# Patient Record
Sex: FEMALE | Race: WHITE | NOT HISPANIC OR LATINO
[De-identification: names, ages, dates, MRNs, and addresses within clinical notes are randomized per-mention and may not be internally consistent; named-entity substitution may affect disease eponyms.]

---

## 2019-04-01 ENCOUNTER — APPOINTMENT (OUTPATIENT)
Dept: OBGYN | Facility: CLINIC | Age: 49
End: 2019-04-01
Payer: MEDICARE

## 2019-04-01 VITALS
WEIGHT: 146 LBS | DIASTOLIC BLOOD PRESSURE: 76 MMHG | HEIGHT: 68 IN | SYSTOLIC BLOOD PRESSURE: 110 MMHG | BODY MASS INDEX: 22.13 KG/M2

## 2019-04-01 PROCEDURE — G0101: CPT

## 2019-04-01 NOTE — HISTORY OF PRESENT ILLNESS
[Last Mammogram ___] : Last Mammogram was [unfilled] [Last Pap ___] : Last cervical pap smear was [unfilled] [Definite:  ___ (Date)] : the last menstrual period was [unfilled] [Regular Cycle Intervals] : periods have been regular [Menarche Age: ____] : age at menarche was [unfilled] [Sexually Active] : is sexually active [Monogamous] : is monogamous [Male ___] : [unfilled] male

## 2019-04-01 NOTE — CHIEF COMPLAINT
[Annual Visit] : annual visit [FreeTextEntry1] : 48 yo  with LMP 3/16/19 presents from prior practice for annual exam.

## 2019-04-03 LAB — HPV HIGH+LOW RISK DNA PNL CVX: NOT DETECTED

## 2019-11-12 ENCOUNTER — FORM ENCOUNTER (OUTPATIENT)
Age: 49
End: 2019-11-12

## 2019-11-13 ENCOUNTER — APPOINTMENT (OUTPATIENT)
Dept: MAMMOGRAPHY | Facility: CLINIC | Age: 49
End: 2019-11-13
Payer: MEDICARE

## 2019-11-13 ENCOUNTER — OUTPATIENT (OUTPATIENT)
Dept: OUTPATIENT SERVICES | Facility: HOSPITAL | Age: 49
LOS: 1 days | End: 2019-11-13
Payer: MEDICARE

## 2019-11-13 DIAGNOSIS — Z00.00 ENCOUNTER FOR GENERAL ADULT MEDICAL EXAMINATION WITHOUT ABNORMAL FINDINGS: ICD-10-CM

## 2019-11-13 PROCEDURE — 77063 BREAST TOMOSYNTHESIS BI: CPT

## 2019-11-13 PROCEDURE — 77067 SCR MAMMO BI INCL CAD: CPT | Mod: 26

## 2019-11-13 PROCEDURE — 77063 BREAST TOMOSYNTHESIS BI: CPT | Mod: 26

## 2019-11-13 PROCEDURE — 77067 SCR MAMMO BI INCL CAD: CPT

## 2019-11-21 ENCOUNTER — APPOINTMENT (OUTPATIENT)
Dept: FAMILY MEDICINE | Facility: CLINIC | Age: 49
End: 2019-11-21
Payer: MEDICARE

## 2019-11-21 VITALS
WEIGHT: 140 LBS | HEIGHT: 59.75 IN | BODY MASS INDEX: 27.48 KG/M2 | OXYGEN SATURATION: 98 % | RESPIRATION RATE: 16 BRPM | DIASTOLIC BLOOD PRESSURE: 68 MMHG | SYSTOLIC BLOOD PRESSURE: 122 MMHG | HEART RATE: 74 BPM

## 2019-11-21 PROCEDURE — G0438: CPT

## 2019-11-21 PROCEDURE — 99213 OFFICE O/P EST LOW 20 MIN: CPT | Mod: 25

## 2019-11-21 NOTE — PLAN
[FreeTextEntry1] : Asthma- well controlled, cont albuterol PRN\par Sinusitis- cont abx and f/u with ENT\par Hair loss- f/u labs however likely secondary to stress\par Anxiety/depression- f/u with behavioral health\par HCM- f/u labs. Refusing flu shot at this time. Has plans to get colonoscopy

## 2019-11-21 NOTE — HEALTH RISK ASSESSMENT
[(PHQ-2) Unable to screen] : PHQ-2: unable to screen [Yes] : Yes [Patient reported mammogram was normal] : Patient reported mammogram was normal [Patient reported PAP Smear was normal] : Patient reported PAP Smear was normal [None] : None [With Family] : lives with family [On disability] : on disability [College] : College [] :  [Feels Safe at Home] : Feels safe at home [Fully functional (bathing, dressing, toileting, transferring, walking, feeding)] : Fully functional (bathing, dressing, toileting, transferring, walking, feeding) [Fully functional (using the telephone, shopping, preparing meals, housekeeping, doing laundry, using] : Fully functional and needs no help or supervision to perform IADLs (using the telephone, shopping, preparing meals, housekeeping, doing laundry, using transportation, managing medications and managing finances) [With Patient/Caregiver] : With Patient/Caregiver [Aggressive treatment] : aggressive treatment [] : No [Smoke Detector] : no smoke detector [Seat Belt] : does not use seat belt [MammogramDate] : 11/19 [PapSmearDate] : 04/19 [AdvancecareDate] : 1/21/19

## 2019-11-21 NOTE — PHYSICAL EXAM
[No Acute Distress] : no acute distress [Well-Appearing] : well-appearing [Normal Sclera/Conjunctiva] : normal sclera/conjunctiva [PERRL] : pupils equal round and reactive to light [Normal Oropharynx] : the oropharynx was normal [No Lymphadenopathy] : no lymphadenopathy [Supple] : supple [No Respiratory Distress] : no respiratory distress  [No Accessory Muscle Use] : no accessory muscle use [Normal Rate] : normal rate  [Regular Rhythm] : with a regular rhythm [Soft] : abdomen soft [Non Tender] : non-tender [Non-distended] : non-distended [Normal Bowel Sounds] : normal bowel sounds [No Rash] : no rash [No Focal Deficits] : no focal deficits [Normal Affect] : the affect was normal [Normal Insight/Judgement] : insight and judgment were intact [de-identified] : + fluid behind L TM
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
## 2019-11-21 NOTE — REVIEW OF SYSTEMS
[Fever] : no fever [Chills] : no chills [Fatigue] : no fatigue [Discharge] : no discharge [Vision Problems] : no vision problems [Earache] : no earache [Nasal Discharge] : no nasal discharge [Sore Throat] : no sore throat [Chest Pain] : no chest pain [Palpitations] : no palpitations [Shortness Of Breath] : no shortness of breath [Wheezing] : no wheezing [Cough] : no cough

## 2020-04-27 ENCOUNTER — LABORATORY RESULT (OUTPATIENT)
Age: 50
End: 2020-04-27

## 2020-04-27 ENCOUNTER — APPOINTMENT (OUTPATIENT)
Dept: FAMILY MEDICINE | Facility: CLINIC | Age: 50
End: 2020-04-27
Payer: MEDICARE

## 2020-04-27 VITALS
DIASTOLIC BLOOD PRESSURE: 80 MMHG | TEMPERATURE: 98.9 F | HEIGHT: 59.75 IN | BODY MASS INDEX: 27.48 KG/M2 | OXYGEN SATURATION: 97 % | SYSTOLIC BLOOD PRESSURE: 136 MMHG | HEART RATE: 79 BPM | RESPIRATION RATE: 16 BRPM | WEIGHT: 140 LBS

## 2020-04-27 DIAGNOSIS — A69.20 LYME DISEASE, UNSPECIFIED: ICD-10-CM
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
## 2020-04-27 DIAGNOSIS — L30.9 DERMATITIS, UNSPECIFIED: ICD-10-CM

## 2020-04-27 PROCEDURE — 99214 OFFICE O/P EST MOD 30 MIN: CPT

## 2020-04-28 LAB — B BURGDOR IGG+IGM SER QL IB: NORMAL

## 2020-05-04 LAB
ANAPLASMA PHAGOCYTO IGM COMENT: NORMAL
ANAPLASMA PHAGOCYTO IGM COMMENT: NORMAL
ANAPLASMA PHAGOCYTOPHILIA IGG ANTIBODIES: NORMAL
ANAPLASMA PHAGOCYTOPHILIA IGM ANTIBODIES: NORMAL
B MICROTI AB SER QL: NORMAL
BABESIA ANTIBODIES, IGG: NORMAL
BABESIA ANTIBODIES, IGM: NORMAL
EHRLICIA CHAFFEENIS IGG ANTIBODIES: NORMAL
EHRLICIA CHAFFEENIS IGG COMMENT: NORMAL
EHRLICIA CHAFFEENIS IGG INTERP: NORMAL
EHRLICIA CHAFFEENIS IGM ANTIBODIES: NORMAL

## 2020-06-15 ENCOUNTER — LABORATORY RESULT (OUTPATIENT)
Age: 50
End: 2020-06-15

## 2020-06-16 LAB — B BURGDOR IGG+IGM SER QL IB: NORMAL

## 2021-01-25 ENCOUNTER — APPOINTMENT (OUTPATIENT)
Dept: FAMILY MEDICINE | Facility: CLINIC | Age: 51
End: 2021-01-25
Payer: MEDICARE

## 2021-01-25 VITALS
DIASTOLIC BLOOD PRESSURE: 80 MMHG | OXYGEN SATURATION: 98 % | WEIGHT: 148 LBS | HEIGHT: 59.75 IN | BODY MASS INDEX: 29.06 KG/M2 | TEMPERATURE: 97.6 F | HEART RATE: 97 BPM | SYSTOLIC BLOOD PRESSURE: 128 MMHG

## 2021-01-25 PROCEDURE — 99072 ADDL SUPL MATRL&STAF TM PHE: CPT

## 2021-01-25 PROCEDURE — 99213 OFFICE O/P EST LOW 20 MIN: CPT

## 2021-01-25 RX ORDER — MELOXICAM 7.5 MG/1
7.5 TABLET ORAL TWICE DAILY
Qty: 60 | Refills: 0 | Status: ACTIVE | COMMUNITY
Start: 2021-01-25 | End: 1900-01-01

## 2021-01-25 RX ORDER — PREDNISONE 10 MG/1
10 TABLET ORAL DAILY
Qty: 30 | Refills: 0 | Status: COMPLETED | COMMUNITY
Start: 2020-04-27 | End: 2021-01-25

## 2021-01-25 RX ORDER — DOXYCYCLINE HYCLATE 100 MG/1
100 CAPSULE ORAL TWICE DAILY
Qty: 14 | Refills: 0 | Status: COMPLETED | COMMUNITY
Start: 2020-04-27 | End: 2021-01-25

## 2021-01-25 NOTE — HISTORY OF PRESENT ILLNESS
[FreeTextEntry8] : 50 year old female presents with low back pain. States last March she fell off her hammock. Went to urgent care and was given flexeril which helped. Then this past December twisted her back out. She has been unable to walk up the stairs due to pain. Admits to low back pain that radiates down right leg to her foot. She admits to hx of back issues and is on disability for multiple herniated discs. She does not see anyone for this and has been dealing with the pain.

## 2021-01-25 NOTE — PHYSICAL EXAM
[No Acute Distress] : no acute distress [Well-Appearing] : well-appearing [Normal Sclera/Conjunctiva] : normal sclera/conjunctiva [PERRL] : pupils equal round and reactive to light [Normal Outer Ear/Nose] : the outer ears and nose were normal in appearance [No Respiratory Distress] : no respiratory distress  [No Accessory Muscle Use] : no accessory muscle use [Clear to Auscultation] : lungs were clear to auscultation bilaterally [Normal Rate] : normal rate  [Soft] : abdomen soft [Non Tender] : non-tender [Non-distended] : non-distended [Normal Bowel Sounds] : normal bowel sounds [No Rash] : no rash [Normal Affect] : the affect was normal [Normal Insight/Judgement] : insight and judgment were intact

## 2021-01-25 NOTE — PLAN
[FreeTextEntry1] : Low back pain with sciatica\par - trial of meloxicam, flexeril and medrol dose ava\par - f/u with ortho spine due to chronic back pain and herniated discs\par - physical therapy referral\par \par f/u for CPE

## 2021-02-01 ENCOUNTER — APPOINTMENT (OUTPATIENT)
Dept: OBGYN | Facility: CLINIC | Age: 51
End: 2021-02-01

## 2021-02-05 ENCOUNTER — APPOINTMENT (OUTPATIENT)
Dept: MRI IMAGING | Facility: CLINIC | Age: 51
End: 2021-02-05
Payer: MEDICARE

## 2021-02-05 PROCEDURE — 72148 MRI LUMBAR SPINE W/O DYE: CPT

## 2021-03-04 ENCOUNTER — APPOINTMENT (OUTPATIENT)
Dept: OBGYN | Facility: CLINIC | Age: 51
End: 2021-03-04

## 2021-03-08 ENCOUNTER — APPOINTMENT (OUTPATIENT)
Dept: NEUROSURGERY | Facility: CLINIC | Age: 51
End: 2021-03-08
Payer: MEDICARE

## 2021-03-08 VITALS
WEIGHT: 146 LBS | HEART RATE: 90 BPM | HEIGHT: 59.75 IN | TEMPERATURE: 98.5 F | SYSTOLIC BLOOD PRESSURE: 135 MMHG | BODY MASS INDEX: 28.66 KG/M2 | DIASTOLIC BLOOD PRESSURE: 84 MMHG

## 2021-03-08 PROCEDURE — 99204 OFFICE O/P NEW MOD 45 MIN: CPT

## 2021-03-08 PROCEDURE — 99072 ADDL SUPL MATRL&STAF TM PHE: CPT

## 2021-03-08 NOTE — PLAN
[FreeTextEntry1] : \par DIAGNOSIS:    LUMBAR  STENOSIS/DISK DEGENERATION/SPONDYLOLISTHESIS  WITH RADICULOPATHY L45 right\par TREATMENT PLAN:  NON-SURGICAL  VS. LUMBAR DECOMPRESSION WITH INSTRUMENTED STABILIZATION AND FUSION   L4-5 \par \par This is a patient with degenerated lumbar disks with associated stenosis and spondylosis.  I have recommended nonsurgical management at this time.  This consists of physical therapy and/or manual medicine in conjunction to medical therapy and other conservative methods.  These include the consideration of trigger point injections and or the utilization of modalities such as TENS where applicable.  The next tier would be the referral to a pain management specialist (anesthesia or physiatry) for the consideration of spinal injections.  This includes the options of epidural steroids, facet injections as well as other novel techniques that may provide pain relief.  \par \par If all nonsurgical methods fail or there is neurological issue of concern, I have recommended lumbar decompression as a treatment option.  This entails removing the lamina and the medial facet joints along with the underlying hypertrophied ligamentum flavum.  This will allow for a widening of the spinal canal and the neuroforamen at the effected levels.  In doing so will likely result in added instability to the spine at this level requiring the need for instrumented stabilization and fusion.  This implies the use of pedicle screws and possibly interbody prosthetics to provide strength and anatomical alignment to the operated segments.  \par \par Risks and benefits of surgery were described to the patient in detail which include but not excluding those otherwise not mentioned:  coma paralysis, death, stroke, spinal fluid leak, nerve injury, weakness, infection, hardware malfunction/failure/mal-positioning requiring re-operation, vascular injury, nonunion/pseudoarthrosis, adjacent segment degeneration, dysphonia/dysphagia and persistent pain.\par \par I have reviewed the images in detail with the patient today in my office and have answered all questions regarding this condition to the best of my ability to the patient’s satisfaction.

## 2021-03-08 NOTE — PHYSICAL EXAM
[Antalgic] : antalgic [Able to toe walk] : the patient was able to toe walk [Able to heel walk] : the patient was able to heel walk [Intact] : all sensory within normal limits bilaterally

## 2021-03-08 NOTE — HISTORY OF PRESENT ILLNESS
[de-identified] : \par Layne is a pleasant 50-year-old here for evaluation of her lumbar spine. She has a history of back problems and has been disabled as a result of this in the past however had some acute issues it occurred over the past year or so. She says she fell off a [employer] March of 2020 and then slipped on some ice in December really exacerbating her back problems she is excruciating back and leg pain on the right and difficulty walking distances. She has what sounds like a combination of axial back pain and some degree of radiculopathy with maybe a neurogenic claudication. She's or the orthopedic colleague of [facility] locally who got x-rays and an MRI study for my review and she seen in the office now with the studies intact. She has no symptoms of cauda equina compression. She has not done any significant nonsurgical management as of yet. Some self-directed physical therapy was only causing her more pain. \par \par hammock   March 2020\par slip on ice December\par \par \par \par ortho Zoe\par PCP Gela Diaz\par

## 2021-03-08 NOTE — REASON FOR VISIT
[New Patient Visit] : a new patient visit [Referred By: _________] : Patient was referred by ALYSIA [FreeTextEntry1] : Lower back pain

## 2021-03-08 NOTE — RESULTS/DATA
[FreeTextEntry1] : \par [facility] MRI reveals a grade 1 spondylolisthesis at L4-5 which is seen on x-rays. She's got facet fluid at the 45 joint and stenosis at this level with disc degeneration.

## 2021-04-02 ENCOUNTER — APPOINTMENT (OUTPATIENT)
Dept: DISASTER EMERGENCY | Facility: CLINIC | Age: 51
End: 2021-04-02

## 2021-04-03 LAB — SARS-COV-2 N GENE NPH QL NAA+PROBE: NOT DETECTED

## 2021-05-07 ENCOUNTER — APPOINTMENT (OUTPATIENT)
Dept: DISASTER EMERGENCY | Facility: CLINIC | Age: 51
End: 2021-05-07

## 2021-05-07 DIAGNOSIS — Z01.818 ENCOUNTER FOR OTHER PREPROCEDURAL EXAMINATION: ICD-10-CM

## 2021-05-08 LAB — SARS-COV-2 N GENE NPH QL NAA+PROBE: NOT DETECTED

## 2021-12-23 ENCOUNTER — APPOINTMENT (OUTPATIENT)
Dept: FAMILY MEDICINE | Facility: CLINIC | Age: 51
End: 2021-12-23
Payer: MEDICARE

## 2021-12-23 DIAGNOSIS — U07.1 COVID-19: ICD-10-CM

## 2021-12-23 PROCEDURE — 99214 OFFICE O/P EST MOD 30 MIN: CPT | Mod: 95

## 2021-12-26 NOTE — HISTORY OF PRESENT ILLNESS
[Home] : at home, [unfilled] , at the time of the visit. [Medical Office: ([facility])___] : at the medical office located in  [Verbal consent obtained from patient] : the patient, [unfilled] [FreeTextEntry8] : Pt dx with COVID 19 PCR+ on 12/20/21. Symptoms started on 12/11/21 w/ cough. Pt still reports chills, low grade fevers, persistent cough. Pt was also started on antibx levofloxacin 500mg q day.\par Pt also has chronic back pains, muscle relaxant has helped in the past.

## 2021-12-26 NOTE — ASSESSMENT
[FreeTextEntry1] : COVID 19 infx - Advised pt to self quarantine. Advised symptomatic care- albuterol prn dyspnea/wheeze, benzonatate prn cough,  tylenol prn myalgias or fevers. Monitor pulse ox. Advised pt on indications to seek ED care incl worsening dyspnea, SpO2< 94, dehydration, AMS\par low back pain - tizanidine prn spasm

## 2022-01-12 ENCOUNTER — APPOINTMENT (OUTPATIENT)
Dept: OBGYN | Facility: CLINIC | Age: 52
End: 2022-01-12
Payer: MEDICARE

## 2022-01-12 ENCOUNTER — APPOINTMENT (OUTPATIENT)
Dept: COLORECTAL SURGERY | Facility: CLINIC | Age: 52
End: 2022-01-12
Payer: MEDICARE

## 2022-01-12 VITALS
HEIGHT: 59.75 IN | BODY MASS INDEX: 27.88 KG/M2 | DIASTOLIC BLOOD PRESSURE: 80 MMHG | SYSTOLIC BLOOD PRESSURE: 120 MMHG | WEIGHT: 142 LBS

## 2022-01-12 VITALS — TEMPERATURE: 98.7 F

## 2022-01-12 DIAGNOSIS — Z87.01 PERSONAL HISTORY OF PNEUMONIA (RECURRENT): ICD-10-CM

## 2022-01-12 PROCEDURE — 99396 PREV VISIT EST AGE 40-64: CPT

## 2022-01-12 PROCEDURE — 99203 OFFICE O/P NEW LOW 30 MIN: CPT

## 2022-01-12 RX ORDER — METHYLPREDNISOLONE 4 MG/1
4 TABLET ORAL
Qty: 1 | Refills: 0 | Status: COMPLETED | COMMUNITY
Start: 2021-01-25 | End: 2022-01-12

## 2022-01-12 NOTE — HISTORY OF PRESENT ILLNESS
[FreeTextEntry1] : 51 year old female who presents for consultation for a screening colonoscopy. She reports alternating constipation and diarrhea mostly related to certain foods in diet. Otherwise, she denies any gastrointestinal symptoms such as abdominal pain, nausea, vomiting, melena or hematochezia.

## 2022-01-12 NOTE — CONSULT LETTER
[Dear  ___] : Dear  [unfilled], [Consult Letter:] : I had the pleasure of evaluating your patient, [unfilled]. [Please see my note below.] : Please see my note below. [Consult Closing:] : Thank you very much for allowing me to participate in the care of this patient.  If you have any questions, please do not hesitate to contact me. [Sincerely,] : Sincerely, [FreeTextEntry3] : Paul Peña MD\par

## 2022-01-12 NOTE — PHYSICAL EXAM
[Appropriately responsive] : appropriately responsive [Alert] : alert [No Acute Distress] : no acute distress [No Lymphadenopathy] : no lymphadenopathy [Soft] : soft [Non-tender] : non-tender [Non-distended] : non-distended [No HSM] : No HSM [No Lesions] : no lesions [No Mass] : no mass [Oriented x3] : oriented x3 [Examination Of The Breasts] : a normal appearance [No Masses] : no breast masses were palpable [Labia Majora] : normal [Labia Minora] : normal [Normal] : normal [Uterine Adnexae] : normal [FreeTextEntry1] : fissures of skin about perineum. Thickened white skin at edge. Not verrucous. [FreeTextEntry9] : tommy negative

## 2022-01-12 NOTE — HISTORY OF PRESENT ILLNESS
[FreeTextEntry1] : 52 yo  with LMP 12/15/21\par \par cc: vaginal irritation\par \par HPI:  12/15 took care of woman with pneumonia. Pt tested positive for COVID-19 on . she was hospitalized with pneumonia and dehydration.  Feeling better now. \par \par OB:\par C/S x 2 sons\par  and  with BTL\par \par Gyn:\par Menstrual triad: 12 x 28  x 5\par all normal Paps\par  MRI pelvis suggested adenomyosis\par \par Med:\par spondylodesis\par \par SH: ,  [Mammogramdate] : 2019 [PapSmeardate] : 2019 [BoneDensityDate] : ? [ColonoscopyDate] : none

## 2022-01-12 NOTE — PHYSICAL EXAM
[Respiratory Effort] : normal respiratory effort [Normal Rate and Rhythm] : normal rate and rhythm [Calm] : calm [de-identified] : soft, non tender, non distended. Pfannenstiel incision noted. No mass or hernias appreciated.   [de-identified] : well appearing, in no distress [de-identified] : normocephalic, atraumatic [de-identified] : moves extremities without difficulty [de-identified] : warm and dry [de-identified] : alert and oriented x 3

## 2022-01-13 ENCOUNTER — NON-APPOINTMENT (OUTPATIENT)
Age: 52
End: 2022-01-13

## 2022-01-16 LAB — HPV HIGH+LOW RISK DNA PNL CVX: NOT DETECTED

## 2022-01-21 ENCOUNTER — APPOINTMENT (OUTPATIENT)
Dept: FAMILY MEDICINE | Facility: CLINIC | Age: 52
End: 2022-01-21

## 2022-01-28 ENCOUNTER — APPOINTMENT (OUTPATIENT)
Dept: OBGYN | Facility: CLINIC | Age: 52
End: 2022-01-28
Payer: MEDICARE

## 2022-01-28 ENCOUNTER — LABORATORY RESULT (OUTPATIENT)
Age: 52
End: 2022-01-28

## 2022-01-28 VITALS
HEIGHT: 59.75 IN | RESPIRATION RATE: 16 BRPM | HEART RATE: 80 BPM | WEIGHT: 142 LBS | DIASTOLIC BLOOD PRESSURE: 74 MMHG | SYSTOLIC BLOOD PRESSURE: 122 MMHG | BODY MASS INDEX: 27.88 KG/M2

## 2022-01-28 DIAGNOSIS — N90.4 LEUKOPLAKIA OF VULVA: ICD-10-CM

## 2022-01-28 PROCEDURE — 56605 BIOPSY OF VULVA/PERINEUM: CPT

## 2022-01-28 NOTE — PROCEDURE
[Local Anesthesia] : local anesthesia [____ Lidocaine w/Epi] : ~VmL  lidocaine with epinephrine [Betadine] : Betadine [Sent to Pathology] : placed in buffered formalin and sent for pathology [Punch] : punch biopsy [Silver Nitrate] : silver nitrate [Tolerated Well] : the patient tolerated the procedure well [No Complications] : there were no complications [de-identified] : 4 mm

## 2022-02-03 ENCOUNTER — RESULT REVIEW (OUTPATIENT)
Age: 52
End: 2022-02-03

## 2022-02-08 ENCOUNTER — APPOINTMENT (OUTPATIENT)
Dept: FAMILY MEDICINE | Facility: CLINIC | Age: 52
End: 2022-02-08
Payer: MEDICARE

## 2022-02-08 ENCOUNTER — APPOINTMENT (OUTPATIENT)
Dept: MAMMOGRAPHY | Facility: CLINIC | Age: 52
End: 2022-02-08
Payer: MEDICARE

## 2022-02-08 ENCOUNTER — OUTPATIENT (OUTPATIENT)
Dept: OUTPATIENT SERVICES | Facility: HOSPITAL | Age: 52
LOS: 1 days | End: 2022-02-08
Payer: MEDICARE

## 2022-02-08 ENCOUNTER — RESULT REVIEW (OUTPATIENT)
Age: 52
End: 2022-02-08

## 2022-02-08 VITALS
TEMPERATURE: 97.3 F | SYSTOLIC BLOOD PRESSURE: 132 MMHG | BODY MASS INDEX: 27.48 KG/M2 | HEIGHT: 59.75 IN | OXYGEN SATURATION: 97 % | WEIGHT: 140 LBS | DIASTOLIC BLOOD PRESSURE: 72 MMHG | HEART RATE: 73 BPM

## 2022-02-08 DIAGNOSIS — Z00.00 ENCOUNTER FOR GENERAL ADULT MEDICAL EXAMINATION W/OUT ABNORMAL FINDINGS: ICD-10-CM

## 2022-02-08 DIAGNOSIS — Z13.220 ENCOUNTER FOR SCREENING FOR LIPOID DISORDERS: ICD-10-CM

## 2022-02-08 DIAGNOSIS — Z13.1 ENCOUNTER FOR SCREENING FOR DIABETES MELLITUS: ICD-10-CM

## 2022-02-08 DIAGNOSIS — Z12.39 ENCOUNTER FOR OTHER SCREENING FOR MALIGNANT NEOPLASM OF BREAST: ICD-10-CM

## 2022-02-08 PROCEDURE — 77063 BREAST TOMOSYNTHESIS BI: CPT | Mod: 26

## 2022-02-08 PROCEDURE — G0439: CPT

## 2022-02-08 PROCEDURE — 77063 BREAST TOMOSYNTHESIS BI: CPT

## 2022-02-08 PROCEDURE — G0444 DEPRESSION SCREEN ANNUAL: CPT | Mod: 59

## 2022-02-08 PROCEDURE — 77067 SCR MAMMO BI INCL CAD: CPT | Mod: 26

## 2022-02-08 PROCEDURE — 77067 SCR MAMMO BI INCL CAD: CPT

## 2022-02-08 PROCEDURE — 99213 OFFICE O/P EST LOW 20 MIN: CPT | Mod: 25

## 2022-02-08 NOTE — PHYSICAL EXAM
[No Acute Distress] : no acute distress [Well-Appearing] : well-appearing [Normal Sclera/Conjunctiva] : normal sclera/conjunctiva [PERRL] : pupils equal round and reactive to light [EOMI] : extraocular movements intact [Normal Outer Ear/Nose] : the outer ears and nose were normal in appearance [Normal TMs] : both tympanic membranes were normal [No Lymphadenopathy] : no lymphadenopathy [Supple] : supple [No Respiratory Distress] : no respiratory distress  [No Accessory Muscle Use] : no accessory muscle use [Clear to Auscultation] : lungs were clear to auscultation bilaterally [Normal Rate] : normal rate  [Regular Rhythm] : with a regular rhythm [Soft] : abdomen soft [Non Tender] : non-tender [Non-distended] : non-distended [Normal Bowel Sounds] : normal bowel sounds [No Focal Deficits] : no focal deficits [Normal Affect] : the affect was normal [Normal Insight/Judgement] : insight and judgment were intact

## 2022-02-08 NOTE — HEALTH RISK ASSESSMENT
[Good] : ~his/her~ current health as good [Fair] :  ~his/her~ mood as fair [Never] : Never [Yes] : Yes [Two or more falls in past year] : Patient reported two or more falls in the past year [Patient reported PAP Smear was normal] : Patient reported PAP Smear was normal [None] : None [With Family] : lives with family [On disability] : on disability [College] : College [] :  [# Of Children ___] : has [unfilled] children [Feels Safe at Home] : Feels safe at home [Fully functional (bathing, dressing, toileting, transferring, walking, feeding)] : Fully functional (bathing, dressing, toileting, transferring, walking, feeding) [Fully functional (using the telephone, shopping, preparing meals, housekeeping, doing laundry, using] : Fully functional and needs no help or supervision to perform IADLs (using the telephone, shopping, preparing meals, housekeeping, doing laundry, using transportation, managing medications and managing finances) [Reports changes in vision] : Reports changes in vision [Reports normal functional visual acuity (ie: able to read med bottle)] : Reports normal functional visual acuity [Smoke Detector] : smoke detector [Seat Belt] :  uses seat belt [de-identified] : GYN, pain management, rheum [de-identified] : social [de-identified] : limited due to back pain  [de-identified] : poor  [Reports changes in hearing] : Reports no changes in hearing [Reports changes in dental health] : Reports no changes in dental health [MammogramDate] : 2/22 [PapSmearDate] : 01/22 [ColonoscopyComments] : scheduled [de-identified] : at times when her back flares up she is unable to do this

## 2022-02-08 NOTE — HISTORY OF PRESENT ILLNESS
[FreeTextEntry1] : PRITESH [de-identified] : 51 year old female presents for AWV and with some concerns.\par She is on diasbility for lumbar stenosis and severe back pain. Followed by ortho spine and pain management. Is interested in another opinion regarding surgery for her back. \ino Had covid and pna in 12/21. Was hospitalized for 1 day. Currently feeling better \ino Has pmhx of asthma but only used inhaler PRN. Does use sparingly \par She states she saw a rheumatologist last year who did many labs and she never got results explained. Looks at labs and found abnormal thyroid abs. Does complain of hair thinning/loss and worried if related to her thyroid

## 2022-02-08 NOTE — PLAN
[FreeTextEntry1] : Hair thinning\par - f/u thyroid labs\par - possibly due to stress\par - derm referral\par \par Arthralgia\par - rheum referral\par \par Low back pain\par - neurosurgery eval\par \par Abnormal thyroid abs\par - discussed hashimotos. Previously had normal thyroid hormone and TSH. WIll retest\par \par HCM\par - f/u labs\par - encourage healthy diet\par - mammo ref\par - GYN UTD

## 2022-02-10 LAB
25(OH)D3 SERPL-MCNC: 55.7 NG/ML
ALBUMIN SERPL ELPH-MCNC: 4.7 G/DL
ALP BLD-CCNC: 43 U/L
ALT SERPL-CCNC: 14 U/L
ANION GAP SERPL CALC-SCNC: 13 MMOL/L
AST SERPL-CCNC: 15 U/L
BASOPHILS # BLD AUTO: 0.04 K/UL
BASOPHILS NFR BLD AUTO: 0.8 %
BILIRUB SERPL-MCNC: 0.3 MG/DL
BUN SERPL-MCNC: 15 MG/DL
CALCIUM SERPL-MCNC: 9.6 MG/DL
CHLORIDE SERPL-SCNC: 104 MMOL/L
CHOLEST SERPL-MCNC: 237 MG/DL
CO2 SERPL-SCNC: 23 MMOL/L
CREAT SERPL-MCNC: 0.72 MG/DL
EOSINOPHIL # BLD AUTO: 0.25 K/UL
EOSINOPHIL NFR BLD AUTO: 4.9 %
ESTIMATED AVERAGE GLUCOSE: 100 MG/DL
FERRITIN SERPL-MCNC: 26 NG/ML
GLUCOSE SERPL-MCNC: 89 MG/DL
HBA1C MFR BLD HPLC: 5.1 %
HCT VFR BLD CALC: 38.5 %
HDLC SERPL-MCNC: 53 MG/DL
HGB BLD-MCNC: 12.6 G/DL
IMM GRANULOCYTES NFR BLD AUTO: 0.2 %
IRON SATN MFR SERPL: 39 %
IRON SERPL-MCNC: 120 UG/DL
LDLC SERPL CALC-MCNC: 156 MG/DL
LYMPHOCYTES # BLD AUTO: 1.71 K/UL
LYMPHOCYTES NFR BLD AUTO: 33.5 %
MAN DIFF?: NORMAL
MCHC RBC-ENTMCNC: 32.4 PG
MCHC RBC-ENTMCNC: 32.7 GM/DL
MCV RBC AUTO: 99 FL
MONOCYTES # BLD AUTO: 0.62 K/UL
MONOCYTES NFR BLD AUTO: 12.2 %
NEUTROPHILS # BLD AUTO: 2.47 K/UL
NEUTROPHILS NFR BLD AUTO: 48.4 %
NONHDLC SERPL-MCNC: 185 MG/DL
PLATELET # BLD AUTO: 234 K/UL
POTASSIUM SERPL-SCNC: 4.7 MMOL/L
PROT SERPL-MCNC: 7.1 G/DL
RBC # BLD: 3.89 M/UL
RBC # FLD: 13.4 %
SODIUM SERPL-SCNC: 140 MMOL/L
T4 FREE SERPL-MCNC: 1 NG/DL
THYROGLOB AB SERPL-ACNC: 31.2 IU/ML
THYROPEROXIDASE AB SERPL IA-ACNC: 2191 IU/ML
TIBC SERPL-MCNC: 310 UG/DL
TRIGL SERPL-MCNC: 147 MG/DL
TSH SERPL-ACNC: 4.31 UIU/ML
UIBC SERPL-MCNC: 190 UG/DL
WBC # FLD AUTO: 5.1 K/UL

## 2022-02-15 ENCOUNTER — OUTPATIENT (OUTPATIENT)
Dept: OUTPATIENT SERVICES | Facility: HOSPITAL | Age: 52
LOS: 1 days | End: 2022-02-15
Payer: MEDICARE

## 2022-02-15 ENCOUNTER — APPOINTMENT (OUTPATIENT)
Dept: MAMMOGRAPHY | Facility: CLINIC | Age: 52
End: 2022-02-15
Payer: MEDICARE

## 2022-02-15 ENCOUNTER — RESULT REVIEW (OUTPATIENT)
Age: 52
End: 2022-02-15

## 2022-02-15 ENCOUNTER — APPOINTMENT (OUTPATIENT)
Dept: ULTRASOUND IMAGING | Facility: CLINIC | Age: 52
End: 2022-02-15
Payer: MEDICARE

## 2022-02-15 DIAGNOSIS — Z00.8 ENCOUNTER FOR OTHER GENERAL EXAMINATION: ICD-10-CM

## 2022-02-15 PROCEDURE — 76642 ULTRASOUND BREAST LIMITED: CPT

## 2022-02-15 PROCEDURE — 76642 ULTRASOUND BREAST LIMITED: CPT | Mod: 26,RT

## 2022-02-16 ENCOUNTER — APPOINTMENT (OUTPATIENT)
Dept: OBGYN | Facility: CLINIC | Age: 52
End: 2022-02-16
Payer: MEDICARE

## 2022-02-16 VITALS
SYSTOLIC BLOOD PRESSURE: 118 MMHG | DIASTOLIC BLOOD PRESSURE: 76 MMHG | WEIGHT: 140 LBS | BODY MASS INDEX: 28.22 KG/M2 | HEIGHT: 59 IN

## 2022-02-16 DIAGNOSIS — L28.0 LICHEN SIMPLEX CHRONICUS: ICD-10-CM

## 2022-02-16 PROCEDURE — 99213 OFFICE O/P EST LOW 20 MIN: CPT

## 2022-02-16 NOTE — HISTORY OF PRESENT ILLNESS
[FreeTextEntry1] : 52 yo with LMP 2/12/22 for follow up for vaginal irritation.\par \par she has noted thickened skin for few weeks. She notices the thickness and picks it off.\par No recent antibiotics.  No h/o HPV\par \par Biopsy performed end of January.\par \par \par \par \par \par

## 2022-02-16 NOTE — DISCUSSION/SUMMARY
[FreeTextEntry1] : Well healing biopsy site\par white border still raised but not itchy.\par Will use estrogen cream "pea sized amount" to introitus for 2 wks. then stop\par \par May use hydrocortisone 1% cream to area following the 2 weeks if itchy to avoiding picking it.\par \par RTO 1 yr\par \par Breast cyst 6 x 5 x 4 mm right at 2:00 \par Breast exam benign today\par Cyst too small to feel\par Will cont BSE, especially pre-menstrual\par Reassurance provided.

## 2022-02-16 NOTE — PHYSICAL EXAM
[Normal] : normal [Examination Of The Breasts] : a normal appearance [No Masses] : no breast masses were palpable [FreeTextEntry1] : well healed 5 mm punch biopsy site. white skin surrounding the healing area. Base is smaller at 3 mm and pulling inward.

## 2022-03-10 ENCOUNTER — NON-APPOINTMENT (OUTPATIENT)
Age: 52
End: 2022-03-10

## 2022-03-10 ENCOUNTER — APPOINTMENT (OUTPATIENT)
Dept: RHEUMATOLOGY | Facility: CLINIC | Age: 52
End: 2022-03-10
Payer: MEDICARE

## 2022-03-10 VITALS
BODY MASS INDEX: 28.83 KG/M2 | HEIGHT: 59 IN | SYSTOLIC BLOOD PRESSURE: 144 MMHG | OXYGEN SATURATION: 100 % | DIASTOLIC BLOOD PRESSURE: 91 MMHG | WEIGHT: 143 LBS | HEART RATE: 71 BPM

## 2022-03-10 DIAGNOSIS — L65.9 NONSCARRING HAIR LOSS, UNSPECIFIED: ICD-10-CM

## 2022-03-10 PROCEDURE — 99204 OFFICE O/P NEW MOD 45 MIN: CPT | Mod: 25

## 2022-03-10 PROCEDURE — 36415 COLL VENOUS BLD VENIPUNCTURE: CPT

## 2022-03-10 NOTE — HISTORY OF PRESENT ILLNESS
[FreeTextEntry1] : 52 y/o female with asthma, Hx of Lyme disease referred to rheumatology for joint pains, fatigue, hair thinning. \par Pt has had back arthritis for a long time. Pt had fall in early 2020 with back injury. Pt is on disability for lumbar stenosis and severe back pain. Pt was also diagnosed with pisiformis syndrome. Pt is following with orthopedics. Pt has received epidural injections and is on gabapentin, meloxicam, tizanidine. Tried cyclobenzaprine in past which helped with tizanidine. PT hurt her more than helped her.\par Reports R knee and ankle pain, possibly from trying to compensating for back pain. Pt had arthroscopic surgery of R knee in the past. Denies joint swelling, redness. AM stiffness of back.\par Numbness/tingling of RLE since trauma.\par Pt was seen by rheumatologist last year and underwent labwork which were never explained to her.\par Recent labs from 2/2022 with TPO Ab+ and mild elevated TSH for which pt was referred to endocrinology because pt had reaction to synthroid in the past (rash all over the head - scabs, pimples).\par Reports hair thinning, dry mouth.\par Pt had COVID pneumonia and hospitalized for 1 day in 12/2021.\par \par Patient denies fever, nasopharyngeal ulcers, chest pain, abdominal pain, cough, SOB, nausea, vomiting, diarrhea, blood in stool, hematuria, rash, Raynaud's, dry eyes, miscarriages (2 children), Hx of DVT/PEs.\par ROS negative unless otherwise noted above.\par No family Hx of autoimmune diseases\par

## 2022-03-10 NOTE — ASSESSMENT
[FreeTextEntry1] : 52 y/o female with asthma, Hx of Lyme disease referred to rheumatology for joint pains, fatigue, hair thinning. \par Pt has had back arthritis for a long time. Pt had fall in early 2020 with back injury. Pt is on disability for lumbar stenosis and severe back pain. Pt was also diagnosed with pisiformis syndrome. Pt is following with orthopedics. Pt has received epidural injections and is on gabapentin, meloxicam, tizanidine. Tried cyclobenzaprine in past which helped with tizanidine. PT hurt her more than helped her.\par Reports R knee and ankle pain, possibly from trying to compensating for back pain. Pt had arthroscopic surgery of R knee in the past. Denies joint swelling, redness. AM stiffness of back.\par Numbness/tingling of RLE since trauma.\par Pt was seen by rheumatologist last year and underwent labwork which were never explained to her.\par Recent labs from 2/2022 with TPO Ab+ and mild elevated TSH for which pt was referred to endocrinology because pt had reaction to synthroid in the past (rash all over the head - scabs, pimples).\par Reports hair thinning, dry mouth.\par Pt had COVID pneumonia and hospitalized for 1 day in 12/2021.\par No family Hx of autoimmune diseases\par \par Patient has mainly back pain which is clearly attributable to known spinal OA with trauma, and R>L LE pain likely combination of lumbar nerve compression from spinal OA and malcompensation from back pain. Pt does not have inflammatory characteristic of joint pains. Pt has fatigue and hair thinning which are non-specific (can be from pt's low thyroid function related to Hashimoto's or pre-menopausal?) and denies any other systemic symptoms.\par There is low suspicion for underlying autoimmune rheumatologic disease.\par \par - Obtain labwork to evaluate for underlying autoimmune rheum diseases\par - Pt to follow up with endocrinology for treatment of Hashimoto's and evaluation for menopause\par - Pt is on multiple medications for her spinal OA. Pt has failed PT in past as it made her feel worse. Pt is receiving epidural injections. Pt is undergoing various pain therapies. Pt should follow up with orthopedics or neurosurgery to discuss surgical options if conservative management is not sufficient.\par - Will contact pt with results of above workup. If unremarkable, pt does not need to follow up regularly with rheumatology. RTC PRN.\par \par

## 2022-03-11 LAB
25(OH)D3 SERPL-MCNC: 61.8 NG/ML
ALBUMIN SERPL ELPH-MCNC: 5.1 G/DL
ALP BLD-CCNC: 44 U/L
ALT SERPL-CCNC: 19 U/L
ANION GAP SERPL CALC-SCNC: 14 MMOL/L
AST SERPL-CCNC: 16 U/L
BASOPHILS # BLD AUTO: 0.07 K/UL
BASOPHILS NFR BLD AUTO: 1.2 %
BILIRUB SERPL-MCNC: 0.3 MG/DL
BUN SERPL-MCNC: 22 MG/DL
C3 SERPL-MCNC: 120 MG/DL
C4 SERPL-MCNC: 18 MG/DL
CALCIUM SERPL-MCNC: 10.2 MG/DL
CCP AB SER IA-ACNC: <8 UNITS
CHLORIDE SERPL-SCNC: 102 MMOL/L
CO2 SERPL-SCNC: 26 MMOL/L
CREAT SERPL-MCNC: 0.81 MG/DL
CRP SERPL-MCNC: <3 MG/L
DSDNA AB SER-ACNC: <12 IU/ML
EGFR: 88 ML/MIN/1.73M2
EOSINOPHIL # BLD AUTO: 0.22 K/UL
EOSINOPHIL NFR BLD AUTO: 3.7 %
ERYTHROCYTE [SEDIMENTATION RATE] IN BLOOD BY WESTERGREN METHOD: 2 MM/HR
FERRITIN SERPL-MCNC: 18 NG/ML
FOLATE SERPL-MCNC: >20 NG/ML
GLUCOSE SERPL-MCNC: 92 MG/DL
HCT VFR BLD CALC: 42.6 %
HGB BLD-MCNC: 13.4 G/DL
IMM GRANULOCYTES NFR BLD AUTO: 0.2 %
IRON SATN MFR SERPL: 33 %
IRON SERPL-MCNC: 110 UG/DL
LYMPHOCYTES # BLD AUTO: 2.06 K/UL
LYMPHOCYTES NFR BLD AUTO: 34.6 %
MAN DIFF?: NORMAL
MCHC RBC-ENTMCNC: 31.5 GM/DL
MCHC RBC-ENTMCNC: 32.4 PG
MCV RBC AUTO: 102.9 FL
MONOCYTES # BLD AUTO: 0.6 K/UL
MONOCYTES NFR BLD AUTO: 10.1 %
NEUTROPHILS # BLD AUTO: 2.99 K/UL
NEUTROPHILS NFR BLD AUTO: 50.2 %
PLATELET # BLD AUTO: 280 K/UL
POTASSIUM SERPL-SCNC: 4.8 MMOL/L
PROT SERPL-MCNC: 7.5 G/DL
RBC # BLD: 4.14 M/UL
RBC # FLD: 13 %
RF+CCP IGG SER-IMP: NEGATIVE
RHEUMATOID FACT SER QL: <10 IU/ML
SODIUM SERPL-SCNC: 142 MMOL/L
TIBC SERPL-MCNC: 337 UG/DL
UIBC SERPL-MCNC: 227 UG/DL
VIT B12 SERPL-MCNC: 1195 PG/ML
WBC # FLD AUTO: 5.95 K/UL

## 2022-03-12 LAB
ENA RNP AB SER IA-ACNC: <0.2 AL
ENA SM AB SER IA-ACNC: <0.2 AL
ENA SS-A AB SER IA-ACNC: <0.2 AL
ENA SS-B AB SER IA-ACNC: <0.2 AL

## 2022-03-16 ENCOUNTER — NON-APPOINTMENT (OUTPATIENT)
Age: 52
End: 2022-03-16

## 2022-03-16 LAB
ANA PAT FLD IF-IMP: ABNORMAL
ANA SER IF-ACNC: ABNORMAL

## 2022-03-23 ENCOUNTER — OUTPATIENT (OUTPATIENT)
Dept: OUTPATIENT SERVICES | Facility: HOSPITAL | Age: 52
LOS: 1 days | End: 2022-03-23
Payer: MEDICARE

## 2022-03-23 ENCOUNTER — APPOINTMENT (OUTPATIENT)
Dept: RADIOLOGY | Facility: CLINIC | Age: 52
End: 2022-03-23
Payer: MEDICARE

## 2022-03-23 ENCOUNTER — APPOINTMENT (OUTPATIENT)
Dept: NEUROSURGERY | Facility: CLINIC | Age: 52
End: 2022-03-23
Payer: MEDICARE

## 2022-03-23 VITALS
OXYGEN SATURATION: 98 % | TEMPERATURE: 97.2 F | SYSTOLIC BLOOD PRESSURE: 133 MMHG | BODY MASS INDEX: 29.84 KG/M2 | HEIGHT: 59 IN | HEART RATE: 77 BPM | DIASTOLIC BLOOD PRESSURE: 84 MMHG | WEIGHT: 148 LBS

## 2022-03-23 DIAGNOSIS — M54.2 CERVICALGIA: ICD-10-CM

## 2022-03-23 DIAGNOSIS — M54.50 LOW BACK PAIN, UNSPECIFIED: ICD-10-CM

## 2022-03-23 DIAGNOSIS — G89.29 CERVICALGIA: ICD-10-CM

## 2022-03-23 PROCEDURE — 72082 X-RAY EXAM ENTIRE SPI 2/3 VW: CPT

## 2022-03-23 PROCEDURE — 72050 X-RAY EXAM NECK SPINE 4/5VWS: CPT

## 2022-03-23 PROCEDURE — 99214 OFFICE O/P EST MOD 30 MIN: CPT

## 2022-03-23 PROCEDURE — 72050 X-RAY EXAM NECK SPINE 4/5VWS: CPT | Mod: 26,59

## 2022-03-23 PROCEDURE — 72110 X-RAY EXAM L-2 SPINE 4/>VWS: CPT

## 2022-03-23 PROCEDURE — 72052 X-RAY EXAM NECK SPINE 6/>VWS: CPT

## 2022-03-23 PROCEDURE — 72082 X-RAY EXAM ENTIRE SPI 2/3 VW: CPT | Mod: 26

## 2022-03-24 ENCOUNTER — NON-APPOINTMENT (OUTPATIENT)
Age: 52
End: 2022-03-24

## 2022-03-24 NOTE — CONSULT LETTER
[Dear  ___] : Dear ~MAYCO, [Courtesy Letter:] : I had the pleasure of seeing your patient, [unfilled], in my office today. [Sincerely,] : Sincerely, [FreeTextEntry2] : Gela Diaz, DO\par [address]\par [place] [id number] [FreeTextEntry1] : Mrs. Bah is a very pleasant 52-year-old female patient who was seen in our office today in regards to neck pain, bilateral leg pain, and low back pain.\par \par The patient endorses a longstanding history of low back pain and neck pain dating back many years.  The patient's pain has been managed previously with conservative therapy but has been worsening since the last 2 years.  The patient's pain is fairly constant rated at a 5/10 in severity with acute exacerbations causing 10/10 pain.  The patient's neck pain is rated at 8/10 when severe, the patient's low back pain is rated at a 10/10 when severe.  The patient additionally complains of radiating pain down the right leg worse than the left.  When asked to describe the localization of her leg pain, it is most consistent with an L5 nerve root distribution.  The patient also has a second type of pain in the mid back slightly left and right to the midline which is sharp, pinpoint, and severe especially with axial loading.  Finally, the patient additionally has a more nebulous type of back pain which is usually worse with prolonged standing.  In addition to her back pain, the patient has  neck pain with bilateral upper extremity numbness.  These symptoms are much less severe for the patient compared to her low back pain and leg symptoms.  The patient describes difficulty walking secondary to pain and the patient has attempted several conservative therapies including physical therapy, chiropractic manipulation, acupuncture, and injections.  The patient currently takes gabapentin, meloxicam, and tizanidine.  The patient has been on disability since 2006.\par \par Patient's past medical history is significant for hypertension, and Hashimoto's thyroiditis.  The patient's current medical regimen includes gabapentin, meloxicam, and tizanidine.  Patient is currently not taking thyroid supplements, but will be reevaluated shortly for her Hashimoto's.  The patient  endorses intolerances to oxycodone, Vicodin, and Percocet.  These medications cause a rash and pruritus.  The patient additionally complains of an intolerance to sulfa and penicillin medications which causes a rash.  Finally, the patient endorses an allergy to latex which "closes her throat" suggestive of anaphylaxis.\par \par On examination, the patient is alert, oriented, and compliant with the exam.  The patient demonstrates 5/5 strength in the upper and lower extremities bilaterally despite some giveaway weakness secondary to pain.  The patient ambulates with a slightly stooped posture and an antalgic gait.  The patient demonstrates 2+ reflexes in the lower extremities bilaterally.\par \par The patient is accompanied with an MRI scan of the lumbar spine dated February 5, 2021.  These images demonstrate mild degenerative disc disease at L4/5 and L5/S1.  There is a possibility of a mild spondylolisthesis at L4/5.  Posteriorly, there appears to be facet widening with associated arthritic changes most notably at L4/5.  No standing films or flexion-extension x-rays are available to review.  The patient has previous imaging from 2015 of the lumbar spine which does not demonstrate this facet widening.  The patient is additionally accompanied with an MRI scan of the cervical spine dated May 11, 2021.  These images demonstrate a loss of cervical lordosis as well as degenerative disc disease most notably at C5-C7.  The combination of the patient's kyphosis and cervical degenerative disc disease at C5/6 causes deformation of the spinal cord but without ongoing compression.\par \par Taken together, the patient has a clinical history and radiographic findings most consistent with bilateral lumbar radiculopathies worse on the right side and chronic low back pain secondary to a developing spondylolisthesis at L4/5.  Although the MRI scan demonstrates only minimal movement, these images were taken supine and are over a year old thus I have recommended a series of dynamic x-rays in addition to an updated MRI scan.  I have recommended follow-up with us once her images are complete so that we can review them together and develop a more specific treatment plan going forward. [FreeTextEntry3] : Dean Cantu MD, PhD, FRCSC, FAANS\par Attending Neurosurgeon\par  of Neurosurgery\par [facility] \[facility]\""/[facility]\par [facility]\par [address],\[place] [address]\par Office: [phone]\par Fax: [phone]

## 2022-03-24 NOTE — REVIEW OF SYSTEMS
[Vertigo] : vertigo [Migraine Headache] : migraine headaches [Anxiety] : anxiety [Eye Pain] : eye pain [Eyesight Problems] : eyesight problems [Shortness Of Breath] : shortness of breath [Diarrhea] : diarrhea [Joint Pain] : joint pain [Negative] : Heme/Lymph [FreeTextEntry2] : fatigue [FreeTextEntry3] : blurry vision

## 2022-04-04 ENCOUNTER — APPOINTMENT (OUTPATIENT)
Dept: OPHTHALMOLOGY | Facility: CLINIC | Age: 52
End: 2022-04-04
Payer: MEDICARE

## 2022-04-04 ENCOUNTER — NON-APPOINTMENT (OUTPATIENT)
Age: 52
End: 2022-04-04

## 2022-04-04 PROCEDURE — 92004 COMPRE OPH EXAM NEW PT 1/>: CPT

## 2022-04-07 ENCOUNTER — APPOINTMENT (OUTPATIENT)
Dept: NEUROSURGERY | Facility: CLINIC | Age: 52
End: 2022-04-07
Payer: MEDICARE

## 2022-04-07 DIAGNOSIS — R53.83 OTHER FATIGUE: ICD-10-CM

## 2022-04-07 DIAGNOSIS — M25.50 PAIN IN UNSPECIFIED JOINT: ICD-10-CM

## 2022-04-07 PROCEDURE — 99215 OFFICE O/P EST HI 40 MIN: CPT

## 2022-04-10 PROBLEM — M25.50 ARTHRALGIA: Status: ACTIVE | Noted: 2022-02-08

## 2022-04-10 PROBLEM — R53.83 FATIGUE: Status: ACTIVE | Noted: 2022-02-08

## 2022-04-10 NOTE — CONSULT LETTER
[Dear  ___] : Dear  [unfilled], [Courtesy Letter:] : I had the pleasure of seeing your patient, [unfilled], in my office today. [Sincerely,] : Sincerely, [FreeTextEntry2] : Gela Diaz, DO\par [address]\[place] [address] \par \par  [FreeTextEntry1] : Mrs. Bah is a very pleasant 52-year-old female patient who was seen in the office today in regards to low back pain and bilateral leg pain worse on the right. The patient was previously seen for these symptoms and was organized advanced imaging which was reviewed today. \par \par Briefly, the patient has been suffering with low back pain with bilateral leg pain dating back several years. In the last 2 years, the patient has been complaining of worsening low back pain with right sided radiating leg pain worse than the right. The patient's pain is currently rated at 5/10 constantly with exacerbations reaching 10/10 severity pain. The patient's pain is worse with axial loading in general. The patient has 3 types of pain. Severe pinpoint pain slightly to the right of midline, radiating pain down the lower extremities worse on the right, and diffuse back pain worse with prolonged standing. At our previous visit, these pains were ascribed to arthritic pain, neurogenic pain, and muscular pain respectively. These symptoms have not changed since her last visit. The patient was organized advanced imaging which was reviewed today. The patient's previous imaging revealed widened facet joints at L4/5 without an obvious spondylolisthesis. \par \par The patient remains alert, oriented and compliant with the examination. The patient continues to demonstrate 5/5 strength in the lower extremities bilaterally despite some giveaway weakness. The patient continues to ambulate with an antalgic gait. \par \par The patient is accompanied with an MRI scan of the lumbar spine dated April 5, 2022. These images continue to demonstrate widened facet joints at L4/5 with bilateral foraminal stenosis secondary to a central disc bulge. A spondylolisthesis is not noted on these scans. The patient is additionally accompanied with a series of supine and standing xrays of the lumbar spine which demonstrate a dynamically unstable L4/5 spondylolisthesis. \par \par Taken together, the patient has a clinical history and radiographic findings most consistent with multifactorial low back and leg pain secondary to a dynamically unstable L4/5 spondylolisthesis. Specifically, I explained to the patient that her radiating leg pain is likely secondary to nerve root irritation secondary to foraminal stenosis, her focal right sided pain is likely arthritic in nature secondary to facet arthrosis, whereas her nebulous back pain is likely secondary to muscle pain and fatigue. At this time, given that the patient has worsening pain despite conservative measures, the patient has been informed that she is a candidate for an L4/5 transforaminal lumbar interbody fusion to stabilize her progressive spondylolisthesis. The risks and potential benefits were explained in detail to the patient and the patient has elected to proceed with surgical intervention at this time. At this time we will endeavor to obtain a surgical date at the patient's earliest convenience.  [FreeTextEntry3] : Dean Cantu MD, PhD, FRCPSC \par Attending Neurosurgeon \par [facility] \par [address]nd floor \[place] [address] \par Office: [phone] \par Fax: [phone]\par \par

## 2022-04-14 ENCOUNTER — OUTPATIENT (OUTPATIENT)
Dept: OUTPATIENT SERVICES | Facility: HOSPITAL | Age: 52
LOS: 1 days | End: 2022-04-14
Payer: MEDICARE

## 2022-04-14 ENCOUNTER — RESULT REVIEW (OUTPATIENT)
Age: 52
End: 2022-04-14

## 2022-04-14 VITALS
TEMPERATURE: 98 F | WEIGHT: 147.05 LBS | OXYGEN SATURATION: 100 % | HEIGHT: 60 IN | RESPIRATION RATE: 16 BRPM | DIASTOLIC BLOOD PRESSURE: 75 MMHG | HEART RATE: 78 BPM | SYSTOLIC BLOOD PRESSURE: 138 MMHG

## 2022-04-14 DIAGNOSIS — Z98.891 HISTORY OF UTERINE SCAR FROM PREVIOUS SURGERY: Chronic | ICD-10-CM

## 2022-04-14 DIAGNOSIS — Z98.890 OTHER SPECIFIED POSTPROCEDURAL STATES: Chronic | ICD-10-CM

## 2022-04-14 DIAGNOSIS — M51.16 INTERVERTEBRAL DISC DISORDERS WITH RADICULOPATHY, LUMBAR REGION: ICD-10-CM

## 2022-04-14 DIAGNOSIS — Z98.51 TUBAL LIGATION STATUS: Chronic | ICD-10-CM

## 2022-04-14 DIAGNOSIS — Z01.818 ENCOUNTER FOR OTHER PREPROCEDURAL EXAMINATION: ICD-10-CM

## 2022-04-14 LAB
ALBUMIN SERPL ELPH-MCNC: 4.1 G/DL — SIGNIFICANT CHANGE UP (ref 3.3–5)
ANION GAP SERPL CALC-SCNC: 3 MMOL/L — LOW (ref 5–17)
APPEARANCE UR: CLEAR — SIGNIFICANT CHANGE UP
APTT BLD: 31.1 SEC — SIGNIFICANT CHANGE UP (ref 27.5–35.5)
BASOPHILS # BLD AUTO: 0.06 K/UL — SIGNIFICANT CHANGE UP (ref 0–0.2)
BASOPHILS NFR BLD AUTO: 0.7 % — SIGNIFICANT CHANGE UP (ref 0–2)
BILIRUB UR-MCNC: NEGATIVE — SIGNIFICANT CHANGE UP
BUN SERPL-MCNC: 18 MG/DL — SIGNIFICANT CHANGE UP (ref 7–23)
CALCIUM SERPL-MCNC: 9.5 MG/DL — SIGNIFICANT CHANGE UP (ref 8.5–10.1)
CHLORIDE SERPL-SCNC: 107 MMOL/L — SIGNIFICANT CHANGE UP (ref 96–108)
CO2 SERPL-SCNC: 28 MMOL/L — SIGNIFICANT CHANGE UP (ref 22–31)
COLOR SPEC: SIGNIFICANT CHANGE UP
CREAT SERPL-MCNC: 0.8 MG/DL — SIGNIFICANT CHANGE UP (ref 0.5–1.3)
DIFF PNL FLD: NEGATIVE — SIGNIFICANT CHANGE UP
EGFR: 89 ML/MIN/1.73M2 — SIGNIFICANT CHANGE UP
EOSINOPHIL # BLD AUTO: 0.3 K/UL — SIGNIFICANT CHANGE UP (ref 0–0.5)
EOSINOPHIL NFR BLD AUTO: 3.4 % — SIGNIFICANT CHANGE UP (ref 0–6)
GLUCOSE SERPL-MCNC: 98 MG/DL — SIGNIFICANT CHANGE UP (ref 70–99)
GLUCOSE UR QL: NEGATIVE — SIGNIFICANT CHANGE UP
HCT VFR BLD CALC: 37.7 % — SIGNIFICANT CHANGE UP (ref 34.5–45)
HGB BLD-MCNC: 12.8 G/DL — SIGNIFICANT CHANGE UP (ref 11.5–15.5)
IMM GRANULOCYTES NFR BLD AUTO: 0.2 % — SIGNIFICANT CHANGE UP (ref 0–1.5)
INR BLD: 1.02 RATIO — SIGNIFICANT CHANGE UP (ref 0.88–1.16)
KETONES UR-MCNC: NEGATIVE — SIGNIFICANT CHANGE UP
LEUKOCYTE ESTERASE UR-ACNC: NEGATIVE — SIGNIFICANT CHANGE UP
LYMPHOCYTES # BLD AUTO: 1.72 K/UL — SIGNIFICANT CHANGE UP (ref 1–3.3)
LYMPHOCYTES # BLD AUTO: 19.5 % — SIGNIFICANT CHANGE UP (ref 13–44)
MCHC RBC-ENTMCNC: 32.2 PG — SIGNIFICANT CHANGE UP (ref 27–34)
MCHC RBC-ENTMCNC: 34 GM/DL — SIGNIFICANT CHANGE UP (ref 32–36)
MCV RBC AUTO: 95 FL — SIGNIFICANT CHANGE UP (ref 80–100)
MONOCYTES # BLD AUTO: 0.67 K/UL — SIGNIFICANT CHANGE UP (ref 0–0.9)
MONOCYTES NFR BLD AUTO: 7.6 % — SIGNIFICANT CHANGE UP (ref 2–14)
NEUTROPHILS # BLD AUTO: 6.04 K/UL — SIGNIFICANT CHANGE UP (ref 1.8–7.4)
NEUTROPHILS NFR BLD AUTO: 68.6 % — SIGNIFICANT CHANGE UP (ref 43–77)
NITRITE UR-MCNC: NEGATIVE — SIGNIFICANT CHANGE UP
PH UR: 7 — SIGNIFICANT CHANGE UP (ref 5–8)
PLATELET # BLD AUTO: 216 K/UL — SIGNIFICANT CHANGE UP (ref 150–400)
POTASSIUM SERPL-MCNC: 4.9 MMOL/L — SIGNIFICANT CHANGE UP (ref 3.5–5.3)
POTASSIUM SERPL-SCNC: 4.9 MMOL/L — SIGNIFICANT CHANGE UP (ref 3.5–5.3)
PROT UR-MCNC: NEGATIVE — SIGNIFICANT CHANGE UP
PROTHROM AB SERPL-ACNC: 11.8 SEC — SIGNIFICANT CHANGE UP (ref 10.5–13.4)
RBC # BLD: 3.97 M/UL — SIGNIFICANT CHANGE UP (ref 3.8–5.2)
RBC # FLD: 11.9 % — SIGNIFICANT CHANGE UP (ref 10.3–14.5)
SODIUM SERPL-SCNC: 138 MMOL/L — SIGNIFICANT CHANGE UP (ref 135–145)
SP GR SPEC: 1 — LOW (ref 1.01–1.02)
UROBILINOGEN FLD QL: NEGATIVE — SIGNIFICANT CHANGE UP
WBC # BLD: 8.81 K/UL — SIGNIFICANT CHANGE UP (ref 3.8–10.5)
WBC # FLD AUTO: 8.81 K/UL — SIGNIFICANT CHANGE UP (ref 3.8–10.5)

## 2022-04-14 PROCEDURE — 87640 STAPH A DNA AMP PROBE: CPT

## 2022-04-14 PROCEDURE — 86900 BLOOD TYPING SEROLOGIC ABO: CPT

## 2022-04-14 PROCEDURE — 36415 COLL VENOUS BLD VENIPUNCTURE: CPT

## 2022-04-14 PROCEDURE — 80048 BASIC METABOLIC PNL TOTAL CA: CPT

## 2022-04-14 PROCEDURE — 83036 HEMOGLOBIN GLYCOSYLATED A1C: CPT

## 2022-04-14 PROCEDURE — 93010 ELECTROCARDIOGRAM REPORT: CPT

## 2022-04-14 PROCEDURE — 82040 ASSAY OF SERUM ALBUMIN: CPT

## 2022-04-14 PROCEDURE — 85730 THROMBOPLASTIN TIME PARTIAL: CPT

## 2022-04-14 PROCEDURE — 71046 X-RAY EXAM CHEST 2 VIEWS: CPT | Mod: 26

## 2022-04-14 PROCEDURE — 86901 BLOOD TYPING SEROLOGIC RH(D): CPT

## 2022-04-14 PROCEDURE — 85025 COMPLETE CBC W/AUTO DIFF WBC: CPT

## 2022-04-14 PROCEDURE — 86850 RBC ANTIBODY SCREEN: CPT

## 2022-04-14 PROCEDURE — 85610 PROTHROMBIN TIME: CPT

## 2022-04-14 PROCEDURE — 71046 X-RAY EXAM CHEST 2 VIEWS: CPT

## 2022-04-14 PROCEDURE — G0463: CPT | Mod: 25

## 2022-04-14 PROCEDURE — 93005 ELECTROCARDIOGRAM TRACING: CPT

## 2022-04-14 PROCEDURE — 81003 URINALYSIS AUTO W/O SCOPE: CPT

## 2022-04-14 PROCEDURE — 87641 MR-STAPH DNA AMP PROBE: CPT

## 2022-04-14 NOTE — H&P PST ADULT - HISTORY OF PRESENT ILLNESS
52 year old female diagnosed presents to PST for planned lumbar disc disease with radiculopathy acquired spondylolisthesis  presents to PST for planned  52 year old female diagnosed lumbar disc disease with radiculopathy and acquired lumbar spondylolisthesis c/o back pain numbness tingling  radiating to BLE; presents to [facility] for planned L4 L5 transforaminal lumbar fusion

## 2022-04-14 NOTE — H&P PST ADULT - NSICDXPASTMEDICALHX_GEN_ALL_CORE_FT
PAST MEDICAL HISTORY:  Asthma controlled; never intubated    Cervical herniated disc     COVID-19 virus infection 12/2021    Gastric ulcer     Hashimoto's disease     HLD (hyperlipidemia)     Lumbar radiculopathy     Seasonal allergies     Spondylolisthesis, lumbar region      PAST MEDICAL HISTORY:  Asthma controlled; never intubated    Cervical herniated disc     COVID-19 virus infection 12/2021    Gastric ulcer     H/o Lyme disease     Hashimoto's disease     HLD (hyperlipidemia)     Lumbar radiculopathy     Seasonal allergies     Spondylolisthesis, lumbar region     Torus palatinus     Vulvar dystrophy

## 2022-04-14 NOTE — H&P PST ADULT - ASSESSMENT
Plan:  1. PST instructions given ; NPO status/  instructions to be given by ASU   2. Pt instructed to take following meds on day of surgery:   3. Pt instructed to take routine evening medications unless indicated   4. Stop NSAIDS ( Aspirin Alev Motrin Mobic Diclofenac), herbal supplements , MVI , Vitamin fish oil 7 days prior to surgery  unless   directed by surgeon or cardiologist;   5. Medical Optimization  with    6. EZ wash instructions given & mupirocin instructions given  7. Labs EKG CXR as per surgeon request   8. Pt instructed to self quarantine after Covid test   9. Covid Testing scheduled Pt notified and aware  10. Pt denies covid symptoms shortness of breath fever cough     CAPRINI SCORE [CLOT]    AGE RELATED RISK FACTORS                                                       MOBILITY RELATED FACTORS  [ ] Age 41-60 years                                            (1 Point)                  [ ] Bed rest                                                        (1 Point)  [ ] Age: 61-74 years                                           (2 Points)                 [ ] Plaster cast                                                   (2 Points)  [ ] Age= 75 years                                              (3 Points)                 [ ] Bed bound for more than 72 hours                 (2 Points)    DISEASE RELATED RISK FACTORS                                               GENDER SPECIFIC FACTORS  [ ] Edema in the lower extremities                       (1 Point)                  [ ] Pregnancy                                                     (1 Point)  [ ] Varicose veins                                               (1 Point)                  [ ] Post-partum < 6 weeks                                   (1 Point)             [ ] BMI > 25 Kg/m2                                            (1 Point)                  [ ] Hormonal therapy  or oral contraception          (1 Point)                 [ ] Sepsis (in the previous month)                        (1 Point)                  [ ] History of pregnancy complications                 (1 point)  [ ] Pneumonia or serious lung disease                                               [ ] Unexplained or recurrent                     (1 Point)           (in the previous month)                               (1 Point)  [ ] Abnormal pulmonary function test                     (1 Point)                 SURGERY RELATED RISK FACTORS  [ ] Acute myocardial infarction                              (1 Point)                 [ ]  Section                                             (1 Point)  [ ] Congestive heart failure (in the previous month)  (1 Point)               [ ] Minor surgery                                                  (1 Point)   [ ] Inflammatory bowel disease                             (1 Point)                 [ ] Arthroscopic surgery                                        (2 Points)  [ ] Central venous access                                      (2 Points)                [ ] General surgery lasting more than 45 minutes   (2 Points)       [ ] Stroke (in the previous month)                          (5 Points)               [ ] Elective arthroplasty                                         (5 Points)            ( ) past and present malignancy                             ( 2 points)                                                                                                                                    HEMATOLOGY RELATED FACTORS                                                 TRAUMA RELATED RISK FACTORS  [ ] Prior episodes of VTE                                     (3 Points)                 [ ] Fracture of the hip, pelvis, or leg                       (5 Points)  [ ] Positive family history for VTE                         (3 Points)                 [ ] Acute spinal cord injury (in the previous month)  (5 Points)  [ ] Prothrombin 18122 A                                     (3 Points)                 [ ] Paralysis  (less than 1 month)                             (5 Points)  [ ] Factor V Leiden                                             (3 Points)                  [ ] Multiple Trauma within 1 month                        (5 Points)  [ ] Lupus anticoagulants                                     (3 Points)                                                           [ ] Anticardiolipin antibodies                               (3 Points)                                                       [ ] High homocysteine in the blood                      (3 Points)                                             [ ] Other congenital or acquired thrombophilia      (3 Points)                                                [ ] Heparin induced thrombocytopenia                  (3 Points)                                          Total Score [          ] 52 year old female diagnosed lumbar disc disease with radiculopathy and acquired lumbar spondylolisthesis c/o back pain numbness tingling  radiating to BLE; presents to PST for planned L4 L5 transforaminal lumbar fusion       Plan:  1. PST instructions given ; NPO status/  instructions to be given by ASU   2. Pt instructed to take following meds on day of surgery: none   3. Pt instructed to take routine evening medications unless indicated   4. Stop NSAIDS ( Aspirin Alev Motrin Mobic Diclofenac), herbal supplements , MVI , Vitamin fish oil 7 days prior to surgery  unless   directed by surgeon or cardiologist;   5. Medical Optimization  with Dr Diaz   6. EZ wash instructions given & mupirocin instructions given  7. Labs EKG CXR as per surgeon request   8. Pt instructed to self quarantine after Covid test   9. Covid Testing scheduled Pt notified and aware  10. Pt denies covid symptoms shortness of breath fever cough   11. Urine for pregnancy on day of surgery     CAPRINI SCORE [CLOT]    AGE RELATED RISK FACTORS                                                       MOBILITY RELATED FACTORS  [x ] Age 41-60 years                                            (1 Point)                  [ ] Bed rest                                                        (1 Point)  [ ] Age: 61-74 years                                           (2 Points)                 [ ] Plaster cast                                                   (2 Points)  [ ] Age= 75 years                                              (3 Points)                 [ ] Bed bound for more than 72 hours                 (2 Points)    DISEASE RELATED RISK FACTORS                                               GENDER SPECIFIC FACTORS  [ ] Edema in the lower extremities                       (1 Point)                  [ ] Pregnancy                                                     (1 Point)  [ ] Varicose veins                                               (1 Point)                  [ ] Post-partum < 6 weeks                                   (1 Point)             [ x] BMI > 25 Kg/m2                                            (1 Point)                  [ ] Hormonal therapy  or oral contraception          (1 Point)                 [ ] Sepsis (in the previous month)                        (1 Point)                  [ ] History of pregnancy complications                 (1 point)  [ ] Pneumonia or serious lung disease                                               [ ] Unexplained or recurrent                     (1 Point)           (in the previous month)                               (1 Point)  [ ] Abnormal pulmonary function test                     (1 Point)                 SURGERY RELATED RISK FACTORS  [ ] Acute myocardial infarction                              (1 Point)                 [ ]  Section                                             (1 Point)  [ ] Congestive heart failure (in the previous month)  (1 Point)               [ ] Minor surgery                                                  (1 Point)   [ ] Inflammatory bowel disease                             (1 Point)                 [ ] Arthroscopic surgery                                        (2 Points)  [ ] Central venous access                                      (2 Points)                [ x] General surgery lasting more than 45 minutes   (2 Points)       [ ] Stroke (in the previous month)                          (5 Points)               [ ] Elective arthroplasty                                         (5 Points)            ( ) past and present malignancy                             ( 2 points)                                                                                                                                    HEMATOLOGY RELATED FACTORS                                                 TRAUMA RELATED RISK FACTORS  [ ] Prior episodes of VTE                                     (3 Points)                 [ ] Fracture of the hip, pelvis, or leg                       (5 Points)  [ ] Positive family history for VTE                         (3 Points)                 [ ] Acute spinal cord injury (in the previous month)  (5 Points)  [ ] Prothrombin 46564 A                                     (3 Points)                 [ ] Paralysis  (less than 1 month)                             (5 Points)  [ ] Factor V Leiden                                             (3 Points)                  [ ] Multiple Trauma within 1 month                        (5 Points)  [ ] Lupus anticoagulants                                     (3 Points)                                                           [ ] Anticardiolipin antibodies                               (3 Points)                                                       [ ] High homocysteine in the blood                      (3 Points)                                             [ ] Other congenital or acquired thrombophilia      (3 Points)                                                [ ] Heparin induced thrombocytopenia                  (3 Points)                                          Total Score [       4  ]    The Caprini score indicates this patient is at risk for a VTE event (score 3-5).  Most surgical patients in this group would benefit from pharmacologic prophylaxis.  The surgical team will determine the balance between VTE risk and bleeding risk  52 year old female diagnosed lumbar disc disease with radiculopathy and acquired lumbar spondylolisthesis c/o back pain numbness tingling  radiating to BLE; presents to PST for planned L4 L5 transforaminal lumbar fusion       Plan:  1. PST instructions given ; NPO status/  instructions to be given by ASU   2. Pt instructed to take following meds on day of surgery: none   3. Pt instructed to take routine evening medications unless indicated   4. Stop NSAIDS ( Aspirin Alev Motrin Mobic Diclofenac), herbal supplements , MVI , Vitamin fish oil 7 days prior to surgery  unless   directed by surgeon or cardiologist;   5. Medical Optimization  with Dr Diaz   6. EZ wash instructions given & mupirocin instructions given  7. Labs EKG CXR as per surgeon request   8. Pt instructed to self quarantine after Covid test   9. Covid Testing scheduled Pt notified and aware  10. Pt denies covid symptoms shortness of breath fever cough   11. Urine for pregnancy on day of surgery   12. TSH 2022 borderline instructed pt to discuss with PCP as well as high cholesterol level during pre-op appt   CAPRINI SCORE [CLOT]    AGE RELATED RISK FACTORS                                                       MOBILITY RELATED FACTORS  [x ] Age 41-60 years                                            (1 Point)                  [ ] Bed rest                                                        (1 Point)  [ ] Age: 61-74 years                                           (2 Points)                 [ ] Plaster cast                                                   (2 Points)  [ ] Age= 75 years                                              (3 Points)                 [ ] Bed bound for more than 72 hours                 (2 Points)    DISEASE RELATED RISK FACTORS                                               GENDER SPECIFIC FACTORS  [ ] Edema in the lower extremities                       (1 Point)                  [ ] Pregnancy                                                     (1 Point)  [ ] Varicose veins                                               (1 Point)                  [ ] Post-partum < 6 weeks                                   (1 Point)             [ x] BMI > 25 Kg/m2                                            (1 Point)                  [ ] Hormonal therapy  or oral contraception          (1 Point)                 [ ] Sepsis (in the previous month)                        (1 Point)                  [ ] History of pregnancy complications                 (1 point)  [ ] Pneumonia or serious lung disease                                               [ ] Unexplained or recurrent                     (1 Point)           (in the previous month)                               (1 Point)  [ ] Abnormal pulmonary function test                     (1 Point)                 SURGERY RELATED RISK FACTORS  [ ] Acute myocardial infarction                              (1 Point)                 [ ]  Section                                             (1 Point)  [ ] Congestive heart failure (in the previous month)  (1 Point)               [ ] Minor surgery                                                  (1 Point)   [ ] Inflammatory bowel disease                             (1 Point)                 [ ] Arthroscopic surgery                                        (2 Points)  [ ] Central venous access                                      (2 Points)                [ x] General surgery lasting more than 45 minutes   (2 Points)       [ ] Stroke (in the previous month)                          (5 Points)               [ ] Elective arthroplasty                                         (5 Points)            ( ) past and present malignancy                             ( 2 points)                                                                                                                                    HEMATOLOGY RELATED FACTORS                                                 TRAUMA RELATED RISK FACTORS  [ ] Prior episodes of VTE                                     (3 Points)                 [ ] Fracture of the hip, pelvis, or leg                       (5 Points)  [ ] Positive family history for VTE                         (3 Points)                 [ ] Acute spinal cord injury (in the previous month)  (5 Points)  [ ] Prothrombin 58292 A                                     (3 Points)                 [ ] Paralysis  (less than 1 month)                             (5 Points)  [ ] Factor V Leiden                                             (3 Points)                  [ ] Multiple Trauma within 1 month                        (5 Points)  [ ] Lupus anticoagulants                                     (3 Points)                                                           [ ] Anticardiolipin antibodies                               (3 Points)                                                       [ ] High homocysteine in the blood                      (3 Points)                                             [ ] Other congenital or acquired thrombophilia      (3 Points)                                                [ ] Heparin induced thrombocytopenia                  (3 Points)                                          Total Score [       4  ]    The Caprini score indicates this patient is at risk for a VTE event (score 3-5).  Most surgical patients in this group would benefit from pharmacologic prophylaxis.  The surgical team will determine the balance between VTE risk and bleeding risk

## 2022-04-14 NOTE — H&P PST ADULT - HEALTH CARE MAINTENANCE
Pt denies travel out of [facility] for the past 14 days Pt denies  travel internationally for the past 21 days

## 2022-04-14 NOTE — H&P PST ADULT - NSICDXPASTSURGICALHX_GEN_ALL_CORE_FT
PAST SURGICAL HISTORY:  H/O arthroscopy of knee right knee    H/O arthroscopy of shoulder x3    H/O:  2012    History of bunionectomy left 2012 right 2002     PAST SURGICAL HISTORY:  H/O arthroscopy of knee right knee    H/O arthroscopy of shoulder x3    H/O tubal ligation     H/O:  2012    History of bunionectomy left 2012 right 2002

## 2022-04-15 DIAGNOSIS — Z01.818 ENCOUNTER FOR OTHER PREPROCEDURAL EXAMINATION: ICD-10-CM

## 2022-04-15 DIAGNOSIS — M51.16 INTERVERTEBRAL DISC DISORDERS WITH RADICULOPATHY, LUMBAR REGION: ICD-10-CM

## 2022-04-15 LAB
A1C WITH ESTIMATED AVERAGE GLUCOSE RESULT: 5.6 % — SIGNIFICANT CHANGE UP (ref 4–5.6)
ESTIMATED AVERAGE GLUCOSE: 114 MG/DL — SIGNIFICANT CHANGE UP (ref 68–114)
MRSA PCR RESULT.: SIGNIFICANT CHANGE UP
S AUREUS DNA NOSE QL NAA+PROBE: DETECTED

## 2022-04-19 ENCOUNTER — APPOINTMENT (OUTPATIENT)
Dept: FAMILY MEDICINE | Facility: CLINIC | Age: 52
End: 2022-04-19

## 2022-04-22 ENCOUNTER — APPOINTMENT (OUTPATIENT)
Dept: NEUROSURGERY | Facility: HOSPITAL | Age: 52
End: 2022-04-22

## 2022-05-25 ENCOUNTER — RX RENEWAL (OUTPATIENT)
Age: 52
End: 2022-05-25

## 2022-05-31 ENCOUNTER — APPOINTMENT (OUTPATIENT)
Dept: ENDOCRINOLOGY | Facility: CLINIC | Age: 52
End: 2022-05-31
Payer: MEDICARE

## 2022-05-31 VITALS — OXYGEN SATURATION: 99 % | HEART RATE: 98 BPM

## 2022-05-31 VITALS
SYSTOLIC BLOOD PRESSURE: 155 MMHG | WEIGHT: 150 LBS | HEIGHT: 59 IN | DIASTOLIC BLOOD PRESSURE: 72 MMHG | BODY MASS INDEX: 30.24 KG/M2

## 2022-05-31 VITALS — SYSTOLIC BLOOD PRESSURE: 126 MMHG | DIASTOLIC BLOOD PRESSURE: 76 MMHG

## 2022-05-31 PROBLEM — U07.1 COVID-19: Chronic | Status: ACTIVE | Noted: 2022-04-14

## 2022-05-31 PROBLEM — M43.16 SPONDYLOLISTHESIS, LUMBAR REGION: Chronic | Status: ACTIVE | Noted: 2022-04-14

## 2022-05-31 PROBLEM — M27.0 DEVELOPMENTAL DISORDERS OF JAWS: Chronic | Status: ACTIVE | Noted: 2022-04-14

## 2022-05-31 PROBLEM — Z86.19 PERSONAL HISTORY OF OTHER INFECTIOUS AND PARASITIC DISEASES: Chronic | Status: ACTIVE | Noted: 2022-04-14

## 2022-05-31 PROBLEM — M50.20 OTHER CERVICAL DISC DISPLACEMENT, UNSPECIFIED CERVICAL REGION: Chronic | Status: ACTIVE | Noted: 2022-04-14

## 2022-05-31 PROBLEM — J45.909 UNSPECIFIED ASTHMA, UNCOMPLICATED: Chronic | Status: ACTIVE | Noted: 2022-04-14

## 2022-05-31 PROBLEM — N90.4 LEUKOPLAKIA OF VULVA: Chronic | Status: ACTIVE | Noted: 2022-04-14

## 2022-05-31 PROBLEM — E06.3 AUTOIMMUNE THYROIDITIS: Chronic | Status: ACTIVE | Noted: 2022-04-14

## 2022-05-31 PROBLEM — K25.9 GASTRIC ULCER, UNSPECIFIED AS ACUTE OR CHRONIC, WITHOUT HEMORRHAGE OR PERFORATION: Chronic | Status: ACTIVE | Noted: 2022-04-14

## 2022-05-31 PROBLEM — M54.16 RADICULOPATHY, LUMBAR REGION: Chronic | Status: ACTIVE | Noted: 2022-04-14

## 2022-05-31 PROBLEM — E78.5 HYPERLIPIDEMIA, UNSPECIFIED: Chronic | Status: ACTIVE | Noted: 2022-04-14

## 2022-05-31 PROCEDURE — 99205 OFFICE O/P NEW HI 60 MIN: CPT

## 2022-05-31 RX ORDER — CYCLOBENZAPRINE HYDROCHLORIDE 10 MG/1
10 TABLET, FILM COATED ORAL 3 TIMES DAILY
Qty: 1 | Refills: 0 | Status: DISCONTINUED | COMMUNITY
Start: 2021-01-25 | End: 2022-05-31

## 2022-05-31 RX ORDER — ESTRADIOL 0.1 MG/G
0.1 CREAM VAGINAL
Qty: 1 | Refills: 1 | Status: DISCONTINUED | COMMUNITY
Start: 2022-02-16 | End: 2022-05-31

## 2022-05-31 RX ORDER — MECLIZINE HYDROCHLORIDE 25 MG/1
25 TABLET ORAL
Refills: 0 | Status: DISCONTINUED | COMMUNITY
End: 2022-05-31

## 2022-05-31 RX ORDER — BENZONATATE 200 MG/1
200 CAPSULE ORAL 3 TIMES DAILY
Qty: 30 | Refills: 0 | Status: DISCONTINUED | COMMUNITY
Start: 2021-12-23 | End: 2022-05-31

## 2022-05-31 RX ORDER — HYDROCORTISONE 10 MG/G
1 CREAM TOPICAL
Qty: 30 | Refills: 0 | Status: DISCONTINUED | COMMUNITY
Start: 2022-02-16 | End: 2022-05-31

## 2022-05-31 NOTE — HISTORY OF PRESENT ILLNESS
[FreeTextEntry1] : Pre-Covid she had slightly abnormal thyroid levels on routine labs and was without symptoms and was prescribed Synthroid or LT4. WIthin 1 week developed blurry vision, eye throbbing, pimples on face, scab in scalp and hair loss. When she stopped the thyroid med -  symptoms resolved.  \par - During Covid for past 2 years, had a back injury and did not follow up with PCP. \par - Had Covid 12/25/2021 and labs 2/8/22 TSH 4.31, FT4 1.0, (+) TPO ab 2191\par - Saw naturopath 2 weeks ago - started iron pills and prescribed some thyroid medication - ordered from internet but she did not start it yet\par \par - also with h/o hyperprolactinemia and brain scans normal.  She took natural medications for this.\par \par

## 2022-05-31 NOTE — CONSULT LETTER
[Dear  ___] : Dear  [unfilled], [Consult Letter:] : I had the pleasure of evaluating your patient, [unfilled]. [Please see my note below.] : Please see my note below. [Consult Closing:] : Thank you very much for allowing me to participate in the care of this patient.  If you have any questions, please do not hesitate to contact me. [Sincerely,] : Sincerely, [FreeTextEntry3] : Teri Hughes, \par

## 2022-05-31 NOTE — REVIEW OF SYSTEMS
[Recent Weight Gain (___ Lbs)] : recent weight gain: [unfilled] lbs [Constipation] : constipation [Diarrhea] : diarrhea [Joint Pain] : joint pain [Myalgia] : myalgia  [Back Pain] : back pain [Stress] : stress [Cold Intolerance] : cold intolerance [Visual Field Defect] : no visual field defect [Blurred Vision] : no blurred vision [Dysphagia] : no dysphagia [Neck Pain] : no neck pain [Dysphonia] : no dysphonia [Chest Pain] : no chest pain [Shortness Of Breath] : no shortness of breath [Irregular Menses] : regular menses [Headaches] : no headaches [Galactorrhea] : no galactorrhea  [FreeTextEntry9] : leg pains due to spine issue [de-identified] : slow hair growth [de-identified] : no breast pains

## 2022-05-31 NOTE — ASSESSMENT
[FreeTextEntry1] : 52 year old female with\par 1. hashimoto's thyroid disease - she is euthyroid on exam but prior labs show trace TSH elevation with normal Free T4 levels. she has history of severe allergy to LT4.\par - TSH elevation ?due to euthyroid sick during recovery from covid infection\par - we discussed hashimoto's thyroid disease - thyroid ab vs thyroid levels, discussed mechanism by which ab cause low thyroid levels, discussed that medication aim to treat thyroid hormone levels, not the ab levels\par - I strongly advised against any naturopathic thyroid hormones due to the lack of FDA regulation/oversight and risks of hyperthyroidism from these medications\par - repeat TFTs, if normal will continue to monitor without medications, if thyroif levels are low - will try [place] thyroid (she will not take synthetic thyroid hormone based on prior experiences) we discussed risks/benefits of [place] thyroid and would prefer she take this than naturopathic thyroid medication\par \par 2. hyperprolactinemia in past  -denies headache, breast pain, galactorrhea\par - check prolactin level\par \par 3. Right thyriod enlargement\par - check thyroid sonogram\par \par - will advise further based on results\par - all questions answered\par

## 2022-05-31 NOTE — PHYSICAL EXAM
[Alert] : alert [No Acute Distress] : no acute distress [EOMI] : extra ocular movement intact [No LAD] : no lymphadenopathy [No Accessory Muscle Use] : no accessory muscle use [Clear to Auscultation] : lungs were clear to auscultation bilaterally [Normal S1, S2] : normal S1 and S2 [Normal Rate] : heart rate was normal [No Edema] : no peripheral edema [Not Tender] : non-tender [Soft] : abdomen soft [Normal Gait] : normal gait [Normal Reflexes] : deep tendon reflexes were 2+ and symmetric [No Tremors] : no tremors [Oriented x3] : oriented to person, place, and time [Normal Affect] : the affect was normal [Normal Insight/Judgement] : insight and judgment were intact [Normal Mood] : the mood was normal [de-identified] : Right thyroid enlarged

## 2022-06-13 ENCOUNTER — NON-APPOINTMENT (OUTPATIENT)
Age: 52
End: 2022-06-13

## 2022-06-29 ENCOUNTER — NON-APPOINTMENT (OUTPATIENT)
Age: 52
End: 2022-06-29

## 2022-08-27 ENCOUNTER — OFFICE (OUTPATIENT)
Dept: URBAN - METROPOLITAN AREA CLINIC 38 | Facility: CLINIC | Age: 52
Setting detail: OPHTHALMOLOGY
End: 2022-08-27
Payer: COMMERCIAL

## 2022-08-27 ENCOUNTER — RX ONLY (RX ONLY)
Age: 52
End: 2022-08-27

## 2022-08-27 DIAGNOSIS — H01.001: ICD-10-CM

## 2022-08-27 DIAGNOSIS — H01.005: ICD-10-CM

## 2022-08-27 DIAGNOSIS — H40.033: ICD-10-CM

## 2022-08-27 DIAGNOSIS — Z88.8: ICD-10-CM

## 2022-08-27 DIAGNOSIS — H25.13: ICD-10-CM

## 2022-08-27 DIAGNOSIS — H01.004: ICD-10-CM

## 2022-08-27 DIAGNOSIS — H01.002: ICD-10-CM

## 2022-08-27 PROCEDURE — 92020 GONIOSCOPY: CPT | Performed by: OPHTHALMOLOGY

## 2022-08-27 PROCEDURE — 99203 OFFICE O/P NEW LOW 30 MIN: CPT | Performed by: OPHTHALMOLOGY

## 2022-08-27 ASSESSMENT — VISUAL ACUITY
OD_BCVA: 20/25
OS_BCVA: 20/25

## 2022-08-27 ASSESSMENT — CONFRONTATIONAL VISUAL FIELD TEST (CVF)
OS_FINDINGS: FULL
OD_FINDINGS: FULL

## 2022-08-27 ASSESSMENT — LID EXAM ASSESSMENTS
OS_BLEPHARITIS: LLL LUL 1+
OD_BLEPHARITIS: RLL RUL 1+

## 2022-09-09 ENCOUNTER — APPOINTMENT (OUTPATIENT)
Dept: FAMILY MEDICINE | Facility: CLINIC | Age: 52
End: 2022-09-09

## 2022-09-09 VITALS
HEART RATE: 89 BPM | HEIGHT: 59 IN | DIASTOLIC BLOOD PRESSURE: 84 MMHG | TEMPERATURE: 97.2 F | OXYGEN SATURATION: 97 % | WEIGHT: 142 LBS | BODY MASS INDEX: 28.63 KG/M2 | SYSTOLIC BLOOD PRESSURE: 122 MMHG

## 2022-09-09 DIAGNOSIS — R05.9 COUGH, UNSPECIFIED: ICD-10-CM

## 2022-09-09 DIAGNOSIS — J45.20 MILD INTERMITTENT ASTHMA, UNCOMPLICATED: ICD-10-CM

## 2022-09-09 PROCEDURE — 99213 OFFICE O/P EST LOW 20 MIN: CPT

## 2022-09-09 NOTE — HISTORY OF PRESENT ILLNESS
[FreeTextEntry8] : 52 year old female presents with fever a few days ago for 1 day. Now having cough that is keeping her up at night. Fever/chills resolved. Denies body aches, congestion. Concerned as she has hx of pna

## 2022-09-09 NOTE — PLAN
[FreeTextEntry1] : Cough\par - as she has hx of asthma and pna, will start abx and medrol ava\par - f/u flu covid/swab\par - cough meds. Advised to take codeine only at night and cannot drive on it. Also advised cannot combine with other medications\par - CXR if no improvement

## 2022-09-13 LAB
INFLUENZA A RESULT: NOT DETECTED
INFLUENZA B RESULT: NOT DETECTED
RESP SYN VIRUS RESULT: NOT DETECTED
SARS-COV-2 RESULT: DETECTED

## 2022-11-29 ENCOUNTER — APPOINTMENT (OUTPATIENT)
Dept: ENDOCRINOLOGY | Facility: CLINIC | Age: 52
End: 2022-11-29

## 2023-01-17 ENCOUNTER — LABORATORY RESULT (OUTPATIENT)
Age: 53
End: 2023-01-17

## 2023-01-18 ENCOUNTER — APPOINTMENT (OUTPATIENT)
Dept: ENDOCRINOLOGY | Facility: CLINIC | Age: 53
End: 2023-01-18
Payer: MEDICARE

## 2023-01-18 VITALS
WEIGHT: 139 LBS | BODY MASS INDEX: 28.02 KG/M2 | DIASTOLIC BLOOD PRESSURE: 84 MMHG | SYSTOLIC BLOOD PRESSURE: 128 MMHG | HEIGHT: 59 IN | HEART RATE: 100 BPM

## 2023-01-18 DIAGNOSIS — Z86.39 PERSONAL HISTORY OF OTHER ENDOCRINE, NUTRITIONAL AND METABOLIC DISEASE: ICD-10-CM

## 2023-01-18 PROCEDURE — 99213 OFFICE O/P EST LOW 20 MIN: CPT | Mod: 25

## 2023-01-18 PROCEDURE — 36415 COLL VENOUS BLD VENIPUNCTURE: CPT

## 2023-01-18 RX ORDER — METHYLPREDNISOLONE 4 MG/1
4 TABLET ORAL
Qty: 1 | Refills: 0 | Status: DISCONTINUED | COMMUNITY
Start: 2022-09-09 | End: 2023-01-18

## 2023-01-18 RX ORDER — ALBUTEROL SULFATE 90 UG/1
108 (90 BASE) AEROSOL, METERED RESPIRATORY (INHALATION)
Refills: 0 | Status: DISCONTINUED | COMMUNITY
End: 2023-01-18

## 2023-01-18 RX ORDER — BENZONATATE 200 MG/1
200 CAPSULE ORAL
Qty: 30 | Refills: 0 | Status: DISCONTINUED | COMMUNITY
Start: 2022-09-09 | End: 2023-01-18

## 2023-01-18 RX ORDER — PROMETHAZINE HYDROCHLORIDE AND CODEINE PHOSPHATE 6.25; 1 MG/5ML; MG/5ML
6.25-1 SOLUTION ORAL
Qty: 150 | Refills: 0 | Status: DISCONTINUED | COMMUNITY
Start: 2022-09-09 | End: 2023-01-18

## 2023-01-18 RX ORDER — CETIRIZINE HYDROCHLORIDE 10 MG/1
10 TABLET, FILM COATED ORAL
Refills: 0 | Status: DISCONTINUED | COMMUNITY
End: 2023-01-18

## 2023-01-18 RX ORDER — ALBUTEROL SULFATE 90 UG/1
108 (90 BASE) INHALANT RESPIRATORY (INHALATION)
Qty: 1 | Refills: 0 | Status: DISCONTINUED | COMMUNITY
Start: 2021-12-23 | End: 2023-01-18

## 2023-01-18 RX ORDER — AZITHROMYCIN 250 MG/1
250 TABLET, FILM COATED ORAL
Qty: 1 | Refills: 0 | Status: DISCONTINUED | COMMUNITY
Start: 2022-09-09 | End: 2023-01-18

## 2023-01-18 NOTE — REVIEW OF SYSTEMS
[Constipation] : constipation [Diarrhea] : diarrhea [Joint Pain] : joint pain [Myalgia] : myalgia  [Back Pain] : back pain [Stress] : stress [Recent Weight Loss (___ Lbs)] : recent weight loss: [unfilled] lbs [Dysphagia] : no dysphagia [Neck Pain] : no neck pain [Dysphonia] : no dysphonia [Palpitations] : no palpitations [Shortness Of Breath] : no shortness of breath [Irregular Menses] : regular menses [Headaches] : no headaches [Cold Intolerance] : no cold intolerance [Galactorrhea] : no galactorrhea  [FreeTextEntry9] : leg pains due to spine issue

## 2023-01-18 NOTE — ASSESSMENT
[FreeTextEntry1] : 52 year old female with\par 1. hashimoto's thyroid disease - she is euthyroid on exam but prior labs show trace TSH elevation with normal Free T4 levels. Repeat TSH 6/2022 was normal\par - TSH elevation ?due to euthyroid sick during recovery from covid infection\par - we discussed hashimoto's thyroid disease - thyroid ab vs thyroid levels, discussed mechanism by which ab cause low thyroid levels, discussed that medication aim to treat thyroid hormone levels, not the ab levels\par - repeat TSH today, if normal will continue to monitor without medications, if thyroid levels are low - may start w trial of Synthroid (prior allergy reported consisted of worsening skin lesion/scalp issues not typically associated w an allergic reaction) prior to [place] thyroid.\par

## 2023-01-18 NOTE — HISTORY OF PRESENT ILLNESS
[FreeTextEntry1] : Pre-Covid she had slightly abnormal thyroid levels on routine labs and was without symptoms and was prescribed Synthroid or LT4. WIthin 1 week developed blurry vision, eye throbbing, pimples on face, scab in scalp and hair loss. When she stopped the thyroid med -  symptoms resolved.  \par - Had Covid 12/25/2021 and labs 2/8/22 TSH 4.31, FT4 1.0, (+) TPO ab 2191\par - not on thyroid meds\par \par - also with h/o hyperprolactinemia and brain scans normal, prolactin level 6/2022 was normal\par \par Interval hx - lumbar spondylolisthesis and needs back surgery\par

## 2023-01-18 NOTE — DATA REVIEWED
[FreeTextEntry1] : Thyroid sono 6/2022 reviewed\par normal thyroid size, diffusely heterogeneous, no thyroid nodule\par \par Labs 6/23/22 \par normal thyroid levels\par normal prolactin level

## 2023-01-18 NOTE — PHYSICAL EXAM
[Alert] : alert [No Acute Distress] : no acute distress [EOMI] : extra ocular movement intact [No LAD] : no lymphadenopathy [No Accessory Muscle Use] : no accessory muscle use [Clear to Auscultation] : lungs were clear to auscultation bilaterally [Normal S1, S2] : normal S1 and S2 [Normal Rate] : heart rate was normal [No Edema] : no peripheral edema [Normal Gait] : normal gait [Normal Reflexes] : deep tendon reflexes were 2+ and symmetric [No Tremors] : no tremors [Oriented x3] : oriented to person, place, and time [Normal Affect] : the affect was normal [Normal Insight/Judgement] : insight and judgment were intact [Normal Mood] : the mood was normal [Thyroid Not Enlarged] : the thyroid was not enlarged [No Thyroid Nodules] : no palpable thyroid nodules

## 2023-01-24 ENCOUNTER — NON-APPOINTMENT (OUTPATIENT)
Age: 53
End: 2023-01-24

## 2023-01-31 ENCOUNTER — APPOINTMENT (OUTPATIENT)
Dept: NEUROSURGERY | Facility: CLINIC | Age: 53
End: 2023-01-31
Payer: MEDICARE

## 2023-01-31 VITALS
BODY MASS INDEX: 27.82 KG/M2 | WEIGHT: 138 LBS | SYSTOLIC BLOOD PRESSURE: 117 MMHG | RESPIRATION RATE: 12 BRPM | DIASTOLIC BLOOD PRESSURE: 81 MMHG | HEART RATE: 91 BPM | HEIGHT: 59 IN | TEMPERATURE: 97.1 F | OXYGEN SATURATION: 100 %

## 2023-01-31 DIAGNOSIS — Z02.82 ENCOUNTER FOR ADOPTION SERVICES: ICD-10-CM

## 2023-01-31 DIAGNOSIS — M43.10 SPONDYLOLISTHESIS, SITE UNSPECIFIED: ICD-10-CM

## 2023-01-31 PROCEDURE — 99215 OFFICE O/P EST HI 40 MIN: CPT

## 2023-02-01 ENCOUNTER — APPOINTMENT (OUTPATIENT)
Dept: OBGYN | Facility: CLINIC | Age: 53
End: 2023-02-01
Payer: MEDICARE

## 2023-02-01 VITALS
DIASTOLIC BLOOD PRESSURE: 80 MMHG | SYSTOLIC BLOOD PRESSURE: 124 MMHG | WEIGHT: 141 LBS | HEIGHT: 59 IN | BODY MASS INDEX: 28.43 KG/M2

## 2023-02-01 PROCEDURE — G0101: CPT

## 2023-02-01 RX ORDER — MV-MIN/FOLIC/VIT K/LYCOP/COQ10 200-100MCG
CAPSULE ORAL
Refills: 0 | Status: ACTIVE | COMMUNITY

## 2023-02-01 NOTE — DISCUSSION/SUMMARY
[FreeTextEntry1] : Health Maintenance: \par \par -Pap with HPV\par -Mammo Rx\par -TBSE\par -colonoscopy guidelines reviewed with patient\par -Achieve Vit D levels of 30-40, intake of 1100 mg daily calcium mostly thru dark green\par leafy greens and milk products, exercise 30 minutes TIW \par \par Back surgery - Dr Alondra GEORGES upcoming\par right hip pain currently\par JESSICA now, but no narcotics as she is allergic

## 2023-02-01 NOTE — HISTORY OF PRESENT ILLNESS
[FreeTextEntry1] : 51yo  female with an LMP 2023 presents today for annual gynecological examimation.\par \par \par OB: C/S x 2 sons\par  and  with BTL\par \par \par GYN:\par Menstrual triad: [id number]\par All paps normal\par 2021 MRI pelvis adenomyosis. \par \par PMHx:\par Spondolythesis- with scheduled lumbar fusion scheduled 23\par \par SH: , ,sexually active with BTL.  [Mammogramdate] : 2/13/2022 [TextBox_19] : BIRADS 0 [BreastSonogramDate] : 2/15/2022 [TextBox_25] : BIRADS 2 [PapSmeardate] : 1/28/2022 [TextBox_31] : NL/HPV neg  [TextBox_43] : Not yet, donell 2022- negative

## 2023-02-09 ENCOUNTER — RESULT REVIEW (OUTPATIENT)
Age: 53
End: 2023-02-09

## 2023-02-09 ENCOUNTER — OUTPATIENT (OUTPATIENT)
Dept: OUTPATIENT SERVICES | Facility: HOSPITAL | Age: 53
LOS: 1 days | End: 2023-02-09
Payer: MEDICARE

## 2023-02-09 ENCOUNTER — APPOINTMENT (OUTPATIENT)
Dept: MAMMOGRAPHY | Facility: CLINIC | Age: 53
End: 2023-02-09
Payer: MEDICARE

## 2023-02-09 DIAGNOSIS — Z12.39 ENCOUNTER FOR OTHER SCREENING FOR MALIGNANT NEOPLASM OF BREAST: ICD-10-CM

## 2023-02-09 PROCEDURE — 76641 ULTRASOUND BREAST COMPLETE: CPT

## 2023-02-09 PROCEDURE — 77063 BREAST TOMOSYNTHESIS BI: CPT

## 2023-02-09 PROCEDURE — 77067 SCR MAMMO BI INCL CAD: CPT

## 2023-02-09 PROCEDURE — 76641 ULTRASOUND BREAST COMPLETE: CPT | Mod: 26,50,GZ

## 2023-02-09 PROCEDURE — 77067 SCR MAMMO BI INCL CAD: CPT | Mod: 26

## 2023-02-09 PROCEDURE — 77063 BREAST TOMOSYNTHESIS BI: CPT | Mod: 26

## 2023-02-15 ENCOUNTER — OUTPATIENT (OUTPATIENT)
Dept: OUTPATIENT SERVICES | Facility: HOSPITAL | Age: 53
LOS: 1 days | End: 2023-02-15
Payer: MEDICARE

## 2023-02-15 VITALS
HEART RATE: 69 BPM | DIASTOLIC BLOOD PRESSURE: 68 MMHG | TEMPERATURE: 98 F | OXYGEN SATURATION: 100 % | HEIGHT: 59.5 IN | WEIGHT: 143.96 LBS | SYSTOLIC BLOOD PRESSURE: 133 MMHG | RESPIRATION RATE: 16 BRPM

## 2023-02-15 DIAGNOSIS — Z01.818 ENCOUNTER FOR OTHER PREPROCEDURAL EXAMINATION: ICD-10-CM

## 2023-02-15 DIAGNOSIS — Z98.890 OTHER SPECIFIED POSTPROCEDURAL STATES: Chronic | ICD-10-CM

## 2023-02-15 DIAGNOSIS — Z29.9 ENCOUNTER FOR PROPHYLACTIC MEASURES, UNSPECIFIED: ICD-10-CM

## 2023-02-15 LAB
ALBUMIN SERPL ELPH-MCNC: 4.1 G/DL — SIGNIFICANT CHANGE UP (ref 3.3–5)
ALP SERPL-CCNC: 41 U/L — SIGNIFICANT CHANGE UP (ref 40–120)
ALT FLD-CCNC: 28 U/L — SIGNIFICANT CHANGE UP (ref 12–78)
ANION GAP SERPL CALC-SCNC: 3 MMOL/L — LOW (ref 5–17)
APPEARANCE UR: CLEAR — SIGNIFICANT CHANGE UP
APTT BLD: 28.5 SEC — SIGNIFICANT CHANGE UP (ref 27.5–35.5)
AST SERPL-CCNC: 19 U/L — SIGNIFICANT CHANGE UP (ref 15–37)
BASOPHILS # BLD AUTO: 0.05 K/UL — SIGNIFICANT CHANGE UP (ref 0–0.2)
BASOPHILS NFR BLD AUTO: 0.9 % — SIGNIFICANT CHANGE UP (ref 0–2)
BILIRUB SERPL-MCNC: 0.2 MG/DL — SIGNIFICANT CHANGE UP (ref 0.2–1.2)
BILIRUB UR-MCNC: NEGATIVE — SIGNIFICANT CHANGE UP
BLD GP AB SCN SERPL QL: SIGNIFICANT CHANGE UP
BUN SERPL-MCNC: 23 MG/DL — SIGNIFICANT CHANGE UP (ref 7–23)
CALCIUM SERPL-MCNC: 9.4 MG/DL — SIGNIFICANT CHANGE UP (ref 8.5–10.1)
CHLORIDE SERPL-SCNC: 105 MMOL/L — SIGNIFICANT CHANGE UP (ref 96–108)
CO2 SERPL-SCNC: 28 MMOL/L — SIGNIFICANT CHANGE UP (ref 22–31)
COLOR SPEC: YELLOW — SIGNIFICANT CHANGE UP
CREAT SERPL-MCNC: 0.82 MG/DL — SIGNIFICANT CHANGE UP (ref 0.5–1.3)
DIFF PNL FLD: NEGATIVE — SIGNIFICANT CHANGE UP
EGFR: 87 ML/MIN/1.73M2 — SIGNIFICANT CHANGE UP
EOSINOPHIL # BLD AUTO: 0.23 K/UL — SIGNIFICANT CHANGE UP (ref 0–0.5)
EOSINOPHIL NFR BLD AUTO: 4.3 % — SIGNIFICANT CHANGE UP (ref 0–6)
GLUCOSE SERPL-MCNC: 104 MG/DL — HIGH (ref 70–99)
GLUCOSE UR QL: NEGATIVE — SIGNIFICANT CHANGE UP
HCT VFR BLD CALC: 36.6 % — SIGNIFICANT CHANGE UP (ref 34.5–45)
HGB BLD-MCNC: 12.2 G/DL — SIGNIFICANT CHANGE UP (ref 11.5–15.5)
IMM GRANULOCYTES NFR BLD AUTO: 0.2 % — SIGNIFICANT CHANGE UP (ref 0–0.9)
INR BLD: 0.98 RATIO — SIGNIFICANT CHANGE UP (ref 0.88–1.16)
KETONES UR-MCNC: NEGATIVE — SIGNIFICANT CHANGE UP
LEUKOCYTE ESTERASE UR-ACNC: NEGATIVE — SIGNIFICANT CHANGE UP
LYMPHOCYTES # BLD AUTO: 1.49 K/UL — SIGNIFICANT CHANGE UP (ref 1–3.3)
LYMPHOCYTES # BLD AUTO: 27.6 % — SIGNIFICANT CHANGE UP (ref 13–44)
MCHC RBC-ENTMCNC: 32.5 PG — SIGNIFICANT CHANGE UP (ref 27–34)
MCHC RBC-ENTMCNC: 33.3 GM/DL — SIGNIFICANT CHANGE UP (ref 32–36)
MCV RBC AUTO: 97.6 FL — SIGNIFICANT CHANGE UP (ref 80–100)
MONOCYTES # BLD AUTO: 0.37 K/UL — SIGNIFICANT CHANGE UP (ref 0–0.9)
MONOCYTES NFR BLD AUTO: 6.9 % — SIGNIFICANT CHANGE UP (ref 2–14)
NEUTROPHILS # BLD AUTO: 3.25 K/UL — SIGNIFICANT CHANGE UP (ref 1.8–7.4)
NEUTROPHILS NFR BLD AUTO: 60.1 % — SIGNIFICANT CHANGE UP (ref 43–77)
NITRITE UR-MCNC: NEGATIVE — SIGNIFICANT CHANGE UP
PH UR: 5 — SIGNIFICANT CHANGE UP (ref 5–8)
PLATELET # BLD AUTO: 212 K/UL — SIGNIFICANT CHANGE UP (ref 150–400)
POTASSIUM SERPL-MCNC: 4.4 MMOL/L — SIGNIFICANT CHANGE UP (ref 3.5–5.3)
POTASSIUM SERPL-SCNC: 4.4 MMOL/L — SIGNIFICANT CHANGE UP (ref 3.5–5.3)
PROT SERPL-MCNC: 7.4 GM/DL — SIGNIFICANT CHANGE UP (ref 6–8.3)
PROT UR-MCNC: NEGATIVE — SIGNIFICANT CHANGE UP
PROTHROM AB SERPL-ACNC: 11.4 SEC — SIGNIFICANT CHANGE UP (ref 10.5–13.4)
RBC # BLD: 3.75 M/UL — LOW (ref 3.8–5.2)
RBC # FLD: 13 % — SIGNIFICANT CHANGE UP (ref 10.3–14.5)
SODIUM SERPL-SCNC: 136 MMOL/L — SIGNIFICANT CHANGE UP (ref 135–145)
SP GR SPEC: 1.01 — SIGNIFICANT CHANGE UP (ref 1.01–1.02)
UROBILINOGEN FLD QL: NEGATIVE — SIGNIFICANT CHANGE UP
WBC # BLD: 5.4 K/UL — SIGNIFICANT CHANGE UP (ref 3.8–10.5)
WBC # FLD AUTO: 5.4 K/UL — SIGNIFICANT CHANGE UP (ref 3.8–10.5)

## 2023-02-15 PROCEDURE — 99214 OFFICE O/P EST MOD 30 MIN: CPT | Mod: 25

## 2023-02-15 PROCEDURE — 86850 RBC ANTIBODY SCREEN: CPT

## 2023-02-15 PROCEDURE — 85730 THROMBOPLASTIN TIME PARTIAL: CPT

## 2023-02-15 PROCEDURE — 36415 COLL VENOUS BLD VENIPUNCTURE: CPT

## 2023-02-15 PROCEDURE — 85610 PROTHROMBIN TIME: CPT

## 2023-02-15 PROCEDURE — 85025 COMPLETE CBC W/AUTO DIFF WBC: CPT

## 2023-02-15 PROCEDURE — 81003 URINALYSIS AUTO W/O SCOPE: CPT

## 2023-02-15 PROCEDURE — 83036 HEMOGLOBIN GLYCOSYLATED A1C: CPT

## 2023-02-15 PROCEDURE — 87086 URINE CULTURE/COLONY COUNT: CPT

## 2023-02-15 PROCEDURE — 87641 MR-STAPH DNA AMP PROBE: CPT

## 2023-02-15 PROCEDURE — 86900 BLOOD TYPING SEROLOGIC ABO: CPT

## 2023-02-15 PROCEDURE — 93010 ELECTROCARDIOGRAM REPORT: CPT

## 2023-02-15 PROCEDURE — 80053 COMPREHEN METABOLIC PANEL: CPT

## 2023-02-15 PROCEDURE — 87640 STAPH A DNA AMP PROBE: CPT

## 2023-02-15 PROCEDURE — 93005 ELECTROCARDIOGRAM TRACING: CPT

## 2023-02-15 PROCEDURE — 86901 BLOOD TYPING SEROLOGIC RH(D): CPT

## 2023-02-15 RX ORDER — GABAPENTIN 400 MG/1
1 CAPSULE ORAL
Qty: 0 | Refills: 0 | DISCHARGE

## 2023-02-15 RX ORDER — MELOXICAM 15 MG/1
1 TABLET ORAL
Qty: 0 | Refills: 0 | DISCHARGE

## 2023-02-15 RX ORDER — TIZANIDINE 4 MG/1
2 TABLET ORAL
Qty: 0 | Refills: 0 | DISCHARGE

## 2023-02-15 RX ORDER — CETIRIZINE HYDROCHLORIDE 10 MG/1
1 TABLET ORAL
Qty: 0 | Refills: 0 | DISCHARGE

## 2023-02-15 NOTE — H&P PST ADULT - HISTORY OF PRESENT ILLNESS
52 years old female with spondylolisthesis of lumbar region, lumbar radiculopathy. Reports progressive constant aching pain to lower back "for years". Sharp pain with some movements. Reports pain across the lower back with radiating burning pain to toes of bilateral lower extremities. Paresthesia to BLE , right more than left. Reports buckling of joints at times. Reports falls, last fell yesterday 2/14/2023. "I tripped over my dog." Denies changes in bowel or urinary habits. Gabapentin for pain. Epidural steroidal injections to back. Patient reports last injection "was a couple of months ago". Planned L4/5 minimally invasive transforaminal lumbar interbody fusion.

## 2023-02-15 NOTE — H&P PST ADULT - ASSESSMENT
52 years old female present to PST prior to L4/5 minimally invasive transforaminal lumbar interbody fusion with neuromonitoring with Dr. Cantu.    Plan   1. NPO as per ASU  2. Take the following medications with sips of water on the day of procedure: Gabapentin  3. Use E-Z sponge as directed  4. Use Mupirocin as directed.  5. Drink a quart of extra  fluids the day before your surgery.  6 Medical optimization for surgery with Dr. Gela Diaz  7. CBC, BMP, CMP, HGB AIC, PT/ INR and PTT, Urinalysis, Urine culture, Type and Screen, MRSA sent to lab  8. EKG done in [facility]  9. Chest x- ray done 2022  10. patient aware of need for covid swab 2023    CAPRINI SCORE [CLOT]    AGE RELATED RISK FACTORS                                                       MOBILITY RELATED FACTORS  [x ] Age 41-60 years                                            (1 Point)                  [ ] Bed rest                                                        (1 Point)  [ ] Age: 61-74 years                                           (2 Points)                 [ ] Plaster cast                                                   (2 Points)  [ ] Age= 75 years                                              (3 Points)                 [ ] Bed bound for more than 72 hours                 (2 Points)    DISEASE RELATED RISK FACTORS                                               GENDER SPECIFIC FACTORS  [ ] Edema in the lower extremities                       (1 Point)                  [ ] Pregnancy                                                     (1 Point)  [ ] Varicose veins                                               (1 Point)                  [ ] Post-partum < 6 weeks                                   (1 Point)             [x ] BMI > 25 Kg/m2                                            (1 Point)                  [ ] Hormonal therapy  or oral contraception          (1 Point)                 [ ] Sepsis (in the previous month)                        (1 Point)                  [ ] History of pregnancy complications                 (1 point)  [ ] Pneumonia or serious lung disease                                               [ ] Unexplained or recurrent                     (1 Point)           (in the previous month)                               (1 Point)  [ ] Abnormal pulmonary function test                     (1 Point)                 SURGERY RELATED RISK FACTORS  [ ] Acute myocardial infarction                              (1 Point)                 [ ]  Section                                             (1 Point)  [ ] Congestive heart failure (in the previous month)  (1 Point)               [ ] Minor surgery                                                  (1 Point)   [ ] Inflammatory bowel disease                             (1 Point)                 [ ] Arthroscopic surgery                                        (2 Points)  [ ] Central venous access                                      (2 Points)                [x ] General surgery lasting more than 45 minutes   (2 Points)       [ ] Stroke (in the previous month)                          (5 Points)               [ ] Elective arthroplasty                                         (5 Points)            [ ] malignancy present or previous                      (2 points)                                                                                                                                   HEMATOLOGY RELATED FACTORS                                                 TRAUMA RELATED RISK FACTORS  [ ] Prior episodes of VTE                                     (3 Points)                 [ ] Fracture of the hip, pelvis, or leg                       (5 Points)  [ ] Positive family history for VTE                         (3 Points)                 [ ] Acute spinal cord injury (in the previous month)  (5 Points)  [ ] Prothrombin 62819 A                                     (3 Points)                 [ ] Paralysis  (less than 1 month)                             (5 Points)  [ ] Factor V Leiden                                             (3 Points)                  [ ] Multiple Trauma within 1 month                        (5 Points)  [ ] Lupus anticoagulants                                     (3 Points)                                                           [ ] Anticardiolipin antibodies                               (3 Points)                                                       [ ] High homocysteine in the blood                      (3 Points)                                             [ ] Other congenital or acquired thrombophilia      (3 Points)                                                [ ] Heparin induced thrombocytopenia                  (3 Points)                                          Total Score [   4       ]

## 2023-02-15 NOTE — H&P PST ADULT - NSICDXFAMILYHX_GEN_ALL_CORE_FT
FAMILY HISTORY:  Sibling  Still living? Yes, Estimated age: 61-70  Family hx of colorectal cancer, Age at diagnosis: Age Unknown

## 2023-02-15 NOTE — H&P PST ADULT - RESPIRATORY RATE (BREATHS/MIN)
Vaccine Information Statement(s) for pediarix prevnar rotateq flu given and reviewed, questions answered, verbal consent given by Parent for injection(s) administered.         16

## 2023-02-15 NOTE — H&P PST ADULT - NSICDXPASTSURGICALHX_GEN_ALL_CORE_FT
PAST SURGICAL HISTORY:  H/O arthroscopy of knee right knee    H/O arthroscopy of shoulder x3    H/O tubal ligation     H/O:  2012    History of bunionectomy left 2012 right 2002    History of colposcopy

## 2023-02-15 NOTE — H&P PST ADULT - NSICDXPASTMEDICALHX_GEN_ALL_CORE_FT
PAST MEDICAL HISTORY:  Anemia     Asthma controlled; never intubated    Cervical herniated disc     Cervical spine pain     COVID-19 virus infection 12/2021    Eczema     Environmental and seasonal allergies     Gastric ulcer     H/o Lyme disease     Hashimoto's disease     Heart murmur     History of ovarian cyst     HLD (hyperlipidemia)     Lumbar radiculopathy     Spondylolisthesis at L4-L5 level     Spondylolisthesis, lumbar region     Torus palatinus     Vulvar dystrophy

## 2023-02-16 DIAGNOSIS — Z01.818 ENCOUNTER FOR OTHER PREPROCEDURAL EXAMINATION: ICD-10-CM

## 2023-02-16 LAB
A1C WITH ESTIMATED AVERAGE GLUCOSE RESULT: 5.4 % — SIGNIFICANT CHANGE UP (ref 4–5.6)
CULTURE RESULTS: SIGNIFICANT CHANGE UP
ESTIMATED AVERAGE GLUCOSE: 108 MG/DL — SIGNIFICANT CHANGE UP (ref 68–114)
MRSA PCR RESULT.: SIGNIFICANT CHANGE UP
S AUREUS DNA NOSE QL NAA+PROBE: SIGNIFICANT CHANGE UP
SPECIMEN SOURCE: SIGNIFICANT CHANGE UP

## 2023-02-17 ENCOUNTER — APPOINTMENT (OUTPATIENT)
Dept: INTERNAL MEDICINE | Facility: CLINIC | Age: 53
End: 2023-02-17
Payer: MEDICARE

## 2023-02-17 VITALS
OXYGEN SATURATION: 99 % | HEIGHT: 59 IN | TEMPERATURE: 97.9 F | HEART RATE: 82 BPM | BODY MASS INDEX: 28.52 KG/M2 | SYSTOLIC BLOOD PRESSURE: 119 MMHG | WEIGHT: 141.5 LBS | DIASTOLIC BLOOD PRESSURE: 81 MMHG

## 2023-02-17 DIAGNOSIS — Z01.818 ENCOUNTER FOR OTHER PREPROCEDURAL EXAMINATION: ICD-10-CM

## 2023-02-17 PROCEDURE — 99203 OFFICE O/P NEW LOW 30 MIN: CPT

## 2023-02-17 NOTE — HISTORY OF PRESENT ILLNESS
[No Pertinent Cardiac History] : no history of aortic stenosis, atrial fibrillation, coronary artery disease, recent myocardial infarction, or implantable device/pacemaker [No Pertinent Pulmonary History] : no history of asthma, COPD, sleep apnea, or smoking [No Adverse Anesthesia Reaction] : no adverse anesthesia reaction in self or family member [Chronic Anticoagulation] : no chronic anticoagulation [Chronic Kidney Disease] : no chronic kidney disease [Diabetes] : no diabetes [Good (7-10 METs)] : Good (7-10 METs) [FreeTextEntry1] : [facility] lumbar interbody fusion [FreeTextEntry2] : 02/24/2023 [FreeTextEntry3] : Dr Dean Cantu [FreeTextEntry4] : 52F with PMH of mild asthma (infrequent inhaler use,), Hashimoto Thyroiditis, and lumbar disc disease with radiculopathy is here for a preop evaluation visit for MCA lumbar interbody surgery on 2/24 with neurosurgeon, Dr. Dean Cantu. Preop testing including labs and EKG was done at [facility] yesterday. She says that beside her lower back pain she feels well. She takes gabapentin, cyclobenzaprine, and tizanidine for the pain. She stopped taking meloxicam in anticipation of her surgery. Otherwise, she takes no other medications. She has a history of mild asthma triggered with cold weather or infections but rarely uses the inhaler (twice a year at most). She has never seen a pulmonologist. In regards to the Hashimoto's thyroiditis, she has seen and endocrinologist who recently tested her TSH which was normal, and attributed a prior elevation to euthyroid sick sinus disease triggered by covid. Patient had surgery in the past with no reactions to anesthesia. She denies any history of clots/bleeding. She denies a diagnosis of obstructive sleep apnea. She is not on chronic steroids. Patient denies current or past smoking, alcohol, or drug use. She is able to fully functions independently and is active with the limitation of the pain that has been limiting her mobility. She denies any other symptoms of recent or current illness, chest pain, shortness of breath abdominal pain, n/v/d/c, edema.

## 2023-02-17 NOTE — PLAN
[FreeTextEntry1] : 52F with PMH of mild asthma (infrequent inhaler use,), Hashimoto Thyroiditis, and lumbar disc disease with radiculopathy is here for a preop evaluation visit for MCA lumbar interbody surgery on 2/24 with neurosurgeon, Dr. Dean Cantu. \par \par #  preop evaluation\par - MCA lumbar interbody surgery on 2/24 with neurosurgeon, Dr. Dean Cantu. at [facility]\par - Preop testing including labs and EKG was done at [facility] yesterday - went over results with the patient. Labs and UA unremarkable. EKG NSR with no acute ischemic changes\par - Patient denies any recent illnesses, chest pain, shortness of breath\par - Patient denies current or past smoking, alcohol, or drug use\par - She is not on chronic steroids. She is able to fully functions independently and is active with the limitation of the pain that has been limiting her mobility (METS>4).\par - Patient had surgery in the past with no reactions to anesthesia. She denies any history of clots/bleeding. She denies a diagnosis of obstructive sleep apnea (STOPBANG SCORE 0). She is not on chronic steroids. \par \par *** RCRI: 0 points, Class I Risk 3.9 % 30-day risk of death, MI, or cardiac arrest\par *** Patient at low risk for intermediate risk procedure***

## 2023-02-17 NOTE — REVIEW OF SYSTEMS
[Joint Pain] : joint pain [Joint Stiffness] : joint stiffness [Joint Swelling] : no joint swelling [Muscle Weakness] : muscle weakness [Muscle Pain] : muscle pain [Back Pain] : back pain [Negative] : Gastrointestinal [FreeTextEntry9] : right sided back pain radiating down her right leg

## 2023-02-17 NOTE — PHYSICAL EXAM
[Normal] : soft, non-tender, non-distended, no masses palpated, no HSM and normal bowel sounds [de-identified] : right sided back pain on palparion

## 2023-02-23 RX ORDER — FENTANYL CITRATE 50 UG/ML
50 INJECTION INTRAVENOUS
Refills: 0 | Status: DISCONTINUED | OUTPATIENT
Start: 2023-02-24 | End: 2023-02-24

## 2023-02-23 RX ORDER — SODIUM CHLORIDE 9 MG/ML
1000 INJECTION, SOLUTION INTRAVENOUS
Refills: 0 | Status: DISCONTINUED | OUTPATIENT
Start: 2023-02-24 | End: 2023-02-24

## 2023-02-23 RX ORDER — ONDANSETRON 8 MG/1
4 TABLET, FILM COATED ORAL ONCE
Refills: 0 | Status: DISCONTINUED | OUTPATIENT
Start: 2023-02-24 | End: 2023-02-24

## 2023-02-24 ENCOUNTER — APPOINTMENT (OUTPATIENT)
Dept: NEUROSURGERY | Facility: HOSPITAL | Age: 53
End: 2023-02-24

## 2023-02-24 ENCOUNTER — INPATIENT (INPATIENT)
Facility: HOSPITAL | Age: 53
LOS: 2 days | Discharge: HOME | DRG: 460 | End: 2023-02-27
Attending: NEUROLOGICAL SURGERY | Admitting: NEUROLOGICAL SURGERY
Payer: MEDICARE

## 2023-02-24 VITALS
WEIGHT: 143.96 LBS | OXYGEN SATURATION: 99 % | SYSTOLIC BLOOD PRESSURE: 128 MMHG | RESPIRATION RATE: 16 BRPM | HEIGHT: 59 IN | HEART RATE: 94 BPM | TEMPERATURE: 99 F | DIASTOLIC BLOOD PRESSURE: 78 MMHG

## 2023-02-24 DIAGNOSIS — M51.16 INTERVERTEBRAL DISC DISORDERS WITH RADICULOPATHY, LUMBAR REGION: ICD-10-CM

## 2023-02-24 DIAGNOSIS — Z98.890 OTHER SPECIFIED POSTPROCEDURAL STATES: Chronic | ICD-10-CM

## 2023-02-24 DIAGNOSIS — Z98.891 HISTORY OF UTERINE SCAR FROM PREVIOUS SURGERY: Chronic | ICD-10-CM

## 2023-02-24 DIAGNOSIS — Z98.51 TUBAL LIGATION STATUS: Chronic | ICD-10-CM

## 2023-02-24 DIAGNOSIS — M43.16 SPONDYLOLISTHESIS, LUMBAR REGION: ICD-10-CM

## 2023-02-24 DIAGNOSIS — M54.16 RADICULOPATHY, LUMBAR REGION: ICD-10-CM

## 2023-02-24 LAB
GLUCOSE BLDC GLUCOMTR-MCNC: 123 MG/DL — HIGH (ref 70–99)
GLUCOSE BLDC GLUCOMTR-MCNC: 142 MG/DL — HIGH (ref 70–99)
GLUCOSE BLDC GLUCOMTR-MCNC: 97 MG/DL — SIGNIFICANT CHANGE UP (ref 70–99)
HCG UR QL: NEGATIVE — SIGNIFICANT CHANGE UP

## 2023-02-24 PROCEDURE — 85027 COMPLETE CBC AUTOMATED: CPT

## 2023-02-24 PROCEDURE — 63052 LAM FACETC/FRMT ARTHRD LUM 1: CPT

## 2023-02-24 PROCEDURE — C9399: CPT

## 2023-02-24 PROCEDURE — 81025 URINE PREGNANCY TEST: CPT

## 2023-02-24 PROCEDURE — 36415 COLL VENOUS BLD VENIPUNCTURE: CPT

## 2023-02-24 PROCEDURE — 80048 BASIC METABOLIC PNL TOTAL CA: CPT

## 2023-02-24 PROCEDURE — C1713: CPT

## 2023-02-24 PROCEDURE — 22840 INSERT SPINE FIXATION DEVICE: CPT

## 2023-02-24 PROCEDURE — 22630 ARTHRD PST TQ 1NTRSPC LUM: CPT

## 2023-02-24 PROCEDURE — C1889: CPT

## 2023-02-24 PROCEDURE — 76000 FLUOROSCOPY <1 HR PHYS/QHP: CPT

## 2023-02-24 PROCEDURE — 97162 PT EVAL MOD COMPLEX 30 MIN: CPT | Mod: GP

## 2023-02-24 PROCEDURE — 97116 GAIT TRAINING THERAPY: CPT | Mod: GP

## 2023-02-24 PROCEDURE — 82962 GLUCOSE BLOOD TEST: CPT

## 2023-02-24 PROCEDURE — 22853 INSJ BIOMECHANICAL DEVICE: CPT

## 2023-02-24 PROCEDURE — 97530 THERAPEUTIC ACTIVITIES: CPT | Mod: GP

## 2023-02-24 RX ORDER — BENZOCAINE AND MENTHOL 5; 1 G/100ML; G/100ML
1 LIQUID ORAL EVERY 4 HOURS
Refills: 0 | Status: DISCONTINUED | OUTPATIENT
Start: 2023-02-24 | End: 2023-02-27

## 2023-02-24 RX ORDER — GLUCOSAMINE/MSM/CHONDROITIN A 750-375MG
3 TABLET ORAL
Qty: 0 | Refills: 0 | DISCHARGE

## 2023-02-24 RX ORDER — CEFAZOLIN SODIUM 1 G
2000 VIAL (EA) INJECTION ONCE
Refills: 0 | Status: COMPLETED | OUTPATIENT
Start: 2023-02-24 | End: 2023-02-25

## 2023-02-24 RX ORDER — GABAPENTIN 400 MG/1
1 CAPSULE ORAL
Qty: 0 | Refills: 0 | DISCHARGE

## 2023-02-24 RX ORDER — LANOLIN ALCOHOL/MO/W.PET/CERES
1 CREAM (GRAM) TOPICAL
Qty: 0 | Refills: 0 | DISCHARGE

## 2023-02-24 RX ORDER — DIPHENHYDRAMINE HCL 50 MG
12.5 CAPSULE ORAL EVERY 4 HOURS
Refills: 0 | Status: DISCONTINUED | OUTPATIENT
Start: 2023-02-24 | End: 2023-02-26

## 2023-02-24 RX ORDER — ONDANSETRON 8 MG/1
4 TABLET, FILM COATED ORAL EVERY 6 HOURS
Refills: 0 | Status: DISCONTINUED | OUTPATIENT
Start: 2023-02-24 | End: 2023-02-27

## 2023-02-24 RX ORDER — HYDROMORPHONE HYDROCHLORIDE 2 MG/ML
0.5 INJECTION INTRAMUSCULAR; INTRAVENOUS; SUBCUTANEOUS
Refills: 0 | Status: DISCONTINUED | OUTPATIENT
Start: 2023-02-24 | End: 2023-02-25

## 2023-02-24 RX ORDER — INFLUENZA VIRUS VACCINE 15; 15; 15; 15 UG/.5ML; UG/.5ML; UG/.5ML; UG/.5ML
0.5 SUSPENSION INTRAMUSCULAR ONCE
Refills: 0 | Status: COMPLETED | OUTPATIENT
Start: 2023-02-24 | End: 2023-02-24

## 2023-02-24 RX ORDER — CEFAZOLIN SODIUM 1 G
2000 VIAL (EA) INJECTION ONCE
Refills: 0 | Status: COMPLETED | OUTPATIENT
Start: 2023-02-24 | End: 2023-02-24

## 2023-02-24 RX ORDER — LIDOCAINE 4 G/100G
2 CREAM TOPICAL EVERY 24 HOURS
Refills: 0 | Status: DISCONTINUED | OUTPATIENT
Start: 2023-02-24 | End: 2023-02-27

## 2023-02-24 RX ORDER — ACETAMINOPHEN 500 MG
650 TABLET ORAL EVERY 6 HOURS
Refills: 0 | Status: DISCONTINUED | OUTPATIENT
Start: 2023-02-24 | End: 2023-02-27

## 2023-02-24 RX ORDER — HYDROMORPHONE HYDROCHLORIDE 2 MG/ML
0.5 INJECTION INTRAMUSCULAR; INTRAVENOUS; SUBCUTANEOUS ONCE
Refills: 0 | Status: DISCONTINUED | OUTPATIENT
Start: 2023-02-24 | End: 2023-02-24

## 2023-02-24 RX ORDER — SELENIOUS ACID 40 UG/ML
1 INJECTION, SOLUTION INTRAVENOUS
Qty: 0 | Refills: 0 | DISCHARGE

## 2023-02-24 RX ORDER — OXYCODONE HYDROCHLORIDE 5 MG/1
5 TABLET ORAL ONCE
Refills: 0 | Status: DISCONTINUED | OUTPATIENT
Start: 2023-02-24 | End: 2023-02-24

## 2023-02-24 RX ORDER — CYCLOBENZAPRINE HYDROCHLORIDE 10 MG/1
1 TABLET, FILM COATED ORAL
Qty: 0 | Refills: 0 | DISCHARGE

## 2023-02-24 RX ORDER — HYDROMORPHONE HYDROCHLORIDE 2 MG/ML
4 INJECTION INTRAMUSCULAR; INTRAVENOUS; SUBCUTANEOUS EVERY 6 HOURS
Refills: 0 | Status: DISCONTINUED | OUTPATIENT
Start: 2023-02-24 | End: 2023-02-25

## 2023-02-24 RX ORDER — SENNA PLUS 8.6 MG/1
2 TABLET ORAL AT BEDTIME
Refills: 0 | Status: DISCONTINUED | OUTPATIENT
Start: 2023-02-24 | End: 2023-02-27

## 2023-02-24 RX ORDER — MELOXICAM 15 MG/1
1 TABLET ORAL
Qty: 0 | Refills: 0 | DISCHARGE

## 2023-02-24 RX ORDER — TIZANIDINE 4 MG/1
2 TABLET ORAL
Qty: 0 | Refills: 0 | DISCHARGE

## 2023-02-24 RX ORDER — HYDROMORPHONE HYDROCHLORIDE 2 MG/ML
2 INJECTION INTRAMUSCULAR; INTRAVENOUS; SUBCUTANEOUS EVERY 6 HOURS
Refills: 0 | Status: DISCONTINUED | OUTPATIENT
Start: 2023-02-24 | End: 2023-02-26

## 2023-02-24 RX ORDER — GABAPENTIN 400 MG/1
800 CAPSULE ORAL THREE TIMES A DAY
Refills: 0 | Status: DISCONTINUED | OUTPATIENT
Start: 2023-02-24 | End: 2023-02-27

## 2023-02-24 RX ADMIN — HYDROMORPHONE HYDROCHLORIDE 0.5 MILLIGRAM(S): 2 INJECTION INTRAMUSCULAR; INTRAVENOUS; SUBCUTANEOUS at 12:35

## 2023-02-24 RX ADMIN — LIDOCAINE 2 PATCH: 4 CREAM TOPICAL at 23:04

## 2023-02-24 RX ADMIN — FENTANYL CITRATE 50 MICROGRAM(S): 50 INJECTION INTRAVENOUS at 13:02

## 2023-02-24 RX ADMIN — ONDANSETRON 4 MILLIGRAM(S): 8 TABLET, FILM COATED ORAL at 16:18

## 2023-02-24 RX ADMIN — HYDROMORPHONE HYDROCHLORIDE 0.5 MILLIGRAM(S): 2 INJECTION INTRAMUSCULAR; INTRAVENOUS; SUBCUTANEOUS at 12:05

## 2023-02-24 RX ADMIN — FENTANYL CITRATE 50 MICROGRAM(S): 50 INJECTION INTRAVENOUS at 12:01

## 2023-02-24 RX ADMIN — FENTANYL CITRATE 50 MICROGRAM(S): 50 INJECTION INTRAVENOUS at 11:31

## 2023-02-24 RX ADMIN — HYDROMORPHONE HYDROCHLORIDE 4 MILLIGRAM(S): 2 INJECTION INTRAMUSCULAR; INTRAVENOUS; SUBCUTANEOUS at 18:25

## 2023-02-24 RX ADMIN — HYDROMORPHONE HYDROCHLORIDE 2 MILLIGRAM(S): 2 INJECTION INTRAMUSCULAR; INTRAVENOUS; SUBCUTANEOUS at 22:03

## 2023-02-24 RX ADMIN — HYDROMORPHONE HYDROCHLORIDE 2 MILLIGRAM(S): 2 INJECTION INTRAMUSCULAR; INTRAVENOUS; SUBCUTANEOUS at 22:33

## 2023-02-24 RX ADMIN — HYDROMORPHONE HYDROCHLORIDE 0.5 MILLIGRAM(S): 2 INJECTION INTRAMUSCULAR; INTRAVENOUS; SUBCUTANEOUS at 14:05

## 2023-02-24 RX ADMIN — Medication 100 MILLIGRAM(S): at 20:45

## 2023-02-24 RX ADMIN — HYDROMORPHONE HYDROCHLORIDE 0.5 MILLIGRAM(S): 2 INJECTION INTRAMUSCULAR; INTRAVENOUS; SUBCUTANEOUS at 16:19

## 2023-02-24 RX ADMIN — SODIUM CHLORIDE 125 MILLILITER(S): 9 INJECTION, SOLUTION INTRAVENOUS at 11:36

## 2023-02-24 RX ADMIN — FENTANYL CITRATE 50 MICROGRAM(S): 50 INJECTION INTRAVENOUS at 12:32

## 2023-02-24 RX ADMIN — HYDROMORPHONE HYDROCHLORIDE 0.5 MILLIGRAM(S): 2 INJECTION INTRAMUSCULAR; INTRAVENOUS; SUBCUTANEOUS at 17:03

## 2023-02-24 RX ADMIN — HYDROMORPHONE HYDROCHLORIDE 4 MILLIGRAM(S): 2 INJECTION INTRAMUSCULAR; INTRAVENOUS; SUBCUTANEOUS at 16:58

## 2023-02-24 NOTE — PROGRESS NOTE ADULT - PROBLEM SELECTOR PLAN 1
Orthotist arrived to provide LSO.  Pt declines, demonstrating they already have LSO from Dr Cantu's office.  No service provided, please call [facility] with any questions, [phone].

## 2023-02-25 LAB
ANION GAP SERPL CALC-SCNC: 3 MMOL/L — LOW (ref 5–17)
BUN SERPL-MCNC: 10 MG/DL — SIGNIFICANT CHANGE UP (ref 7–23)
CALCIUM SERPL-MCNC: 8.2 MG/DL — LOW (ref 8.5–10.1)
CHLORIDE SERPL-SCNC: 112 MMOL/L — HIGH (ref 96–108)
CO2 SERPL-SCNC: 26 MMOL/L — SIGNIFICANT CHANGE UP (ref 22–31)
CREAT SERPL-MCNC: 0.72 MG/DL — SIGNIFICANT CHANGE UP (ref 0.5–1.3)
EGFR: 101 ML/MIN/1.73M2 — SIGNIFICANT CHANGE UP
GLUCOSE BLDC GLUCOMTR-MCNC: 115 MG/DL — HIGH (ref 70–99)
GLUCOSE SERPL-MCNC: 120 MG/DL — HIGH (ref 70–99)
HCT VFR BLD CALC: 31 % — LOW (ref 34.5–45)
HGB BLD-MCNC: 10.4 G/DL — LOW (ref 11.5–15.5)
MCHC RBC-ENTMCNC: 32.4 PG — SIGNIFICANT CHANGE UP (ref 27–34)
MCHC RBC-ENTMCNC: 33.5 GM/DL — SIGNIFICANT CHANGE UP (ref 32–36)
MCV RBC AUTO: 96.6 FL — SIGNIFICANT CHANGE UP (ref 80–100)
PLATELET # BLD AUTO: 154 K/UL — SIGNIFICANT CHANGE UP (ref 150–400)
POTASSIUM SERPL-MCNC: 3.6 MMOL/L — SIGNIFICANT CHANGE UP (ref 3.5–5.3)
POTASSIUM SERPL-SCNC: 3.6 MMOL/L — SIGNIFICANT CHANGE UP (ref 3.5–5.3)
RBC # BLD: 3.21 M/UL — LOW (ref 3.8–5.2)
RBC # FLD: 12.8 % — SIGNIFICANT CHANGE UP (ref 10.3–14.5)
SODIUM SERPL-SCNC: 141 MMOL/L — SIGNIFICANT CHANGE UP (ref 135–145)
WBC # BLD: 5.97 K/UL — SIGNIFICANT CHANGE UP (ref 3.8–10.5)
WBC # FLD AUTO: 5.97 K/UL — SIGNIFICANT CHANGE UP (ref 3.8–10.5)

## 2023-02-25 RX ORDER — ACETAMINOPHEN 500 MG
1000 TABLET ORAL ONCE
Refills: 0 | Status: COMPLETED | OUTPATIENT
Start: 2023-02-25 | End: 2023-02-25

## 2023-02-25 RX ORDER — TIZANIDINE 4 MG/1
4 TABLET ORAL AT BEDTIME
Refills: 0 | Status: DISCONTINUED | OUTPATIENT
Start: 2023-02-25 | End: 2023-02-27

## 2023-02-25 RX ORDER — PROCHLORPERAZINE MALEATE 5 MG
10 TABLET ORAL ONCE
Refills: 0 | Status: DISCONTINUED | OUTPATIENT
Start: 2023-02-25 | End: 2023-02-25

## 2023-02-25 RX ORDER — CYCLOBENZAPRINE HYDROCHLORIDE 10 MG/1
10 TABLET, FILM COATED ORAL THREE TIMES A DAY
Refills: 0 | Status: DISCONTINUED | OUTPATIENT
Start: 2023-02-25 | End: 2023-02-27

## 2023-02-25 RX ORDER — PROCHLORPERAZINE MALEATE 5 MG
10 TABLET ORAL ONCE
Refills: 0 | Status: COMPLETED | OUTPATIENT
Start: 2023-02-25 | End: 2023-02-25

## 2023-02-25 RX ORDER — POLYETHYLENE GLYCOL 3350 17 G/17G
17 POWDER, FOR SOLUTION ORAL DAILY
Refills: 0 | Status: DISCONTINUED | OUTPATIENT
Start: 2023-02-25 | End: 2023-02-27

## 2023-02-25 RX ORDER — HYDROMORPHONE HYDROCHLORIDE 2 MG/ML
0.5 INJECTION INTRAMUSCULAR; INTRAVENOUS; SUBCUTANEOUS
Refills: 0 | Status: DISCONTINUED | OUTPATIENT
Start: 2023-02-25 | End: 2023-02-26

## 2023-02-25 RX ADMIN — Medication 1000 MILLIGRAM(S): at 08:25

## 2023-02-25 RX ADMIN — Medication 1000 MILLIGRAM(S): at 22:18

## 2023-02-25 RX ADMIN — CYCLOBENZAPRINE HYDROCHLORIDE 10 MILLIGRAM(S): 10 TABLET, FILM COATED ORAL at 15:32

## 2023-02-25 RX ADMIN — Medication 10 MILLIGRAM(S): at 12:32

## 2023-02-25 RX ADMIN — Medication 100 MILLIGRAM(S): at 04:20

## 2023-02-25 RX ADMIN — Medication 400 MILLIGRAM(S): at 21:48

## 2023-02-25 RX ADMIN — Medication 400 MILLIGRAM(S): at 08:10

## 2023-02-25 RX ADMIN — TIZANIDINE 4 MILLIGRAM(S): 4 TABLET ORAL at 21:43

## 2023-02-25 RX ADMIN — HYDROMORPHONE HYDROCHLORIDE 2 MILLIGRAM(S): 2 INJECTION INTRAMUSCULAR; INTRAVENOUS; SUBCUTANEOUS at 18:31

## 2023-02-25 RX ADMIN — ONDANSETRON 4 MILLIGRAM(S): 8 TABLET, FILM COATED ORAL at 08:11

## 2023-02-25 RX ADMIN — LIDOCAINE 2 PATCH: 4 CREAM TOPICAL at 23:32

## 2023-02-25 RX ADMIN — HYDROMORPHONE HYDROCHLORIDE 2 MILLIGRAM(S): 2 INJECTION INTRAMUSCULAR; INTRAVENOUS; SUBCUTANEOUS at 17:42

## 2023-02-25 RX ADMIN — TIZANIDINE 4 MILLIGRAM(S): 4 TABLET ORAL at 01:10

## 2023-02-25 RX ADMIN — LIDOCAINE 2 PATCH: 4 CREAM TOPICAL at 08:02

## 2023-02-25 RX ADMIN — GABAPENTIN 800 MILLIGRAM(S): 400 CAPSULE ORAL at 21:43

## 2023-02-25 RX ADMIN — HYDROMORPHONE HYDROCHLORIDE 0.5 MILLIGRAM(S): 2 INJECTION INTRAMUSCULAR; INTRAVENOUS; SUBCUTANEOUS at 23:23

## 2023-02-25 RX ADMIN — GABAPENTIN 800 MILLIGRAM(S): 400 CAPSULE ORAL at 15:32

## 2023-02-25 RX ADMIN — LIDOCAINE 2 PATCH: 4 CREAM TOPICAL at 10:09

## 2023-02-25 RX ADMIN — HYDROMORPHONE HYDROCHLORIDE 4 MILLIGRAM(S): 2 INJECTION INTRAMUSCULAR; INTRAVENOUS; SUBCUTANEOUS at 04:21

## 2023-02-25 RX ADMIN — HYDROMORPHONE HYDROCHLORIDE 0.5 MILLIGRAM(S): 2 INJECTION INTRAMUSCULAR; INTRAVENOUS; SUBCUTANEOUS at 22:53

## 2023-02-25 RX ADMIN — HYDROMORPHONE HYDROCHLORIDE 4 MILLIGRAM(S): 2 INJECTION INTRAMUSCULAR; INTRAVENOUS; SUBCUTANEOUS at 04:51

## 2023-02-25 RX ADMIN — GABAPENTIN 800 MILLIGRAM(S): 400 CAPSULE ORAL at 08:10

## 2023-02-25 NOTE — PROGRESS NOTE ADULT - ASSESSMENT
52 yea old female POD#1 TLIF    dilaudid iv/p0 & tylenol for pain control  zanaflex/flexeril for spasm  continue gabapentin 800mg TID  Zofran for nausea, continue IVF  OOB/PT  incentive spiomerty  SCDs for DVT prophylaxis  d/w Dr Cantu   52 yea old female POD#1 TLIF    dilaudid iv/p0 & tylenol for pain control  zanaflex/flexeril for spasm  continue gabapentin 800mg TID  Zofran for nausea, continue IVF  OOB with brace, PT  incentive spiomerty  SCDs for DVT prophylaxis  d/w Dr Cantu

## 2023-02-25 NOTE — PHYSICAL THERAPY INITIAL EVALUATION ADULT - PERTINENT HX OF CURRENT PROBLEM, REHAB EVAL
52 years old female with Lumbar spondylolisthesis and radiculopathy to bilateral lower extremities R>L scheduled for elective TLIF L4-5 0n 2/25.

## 2023-02-25 NOTE — PHYSICAL THERAPY INITIAL EVALUATION ADULT - GENERAL OBSERVATIONS, REHAB EVAL
Pt. received side laying on bed 2 N + IV LSO at , agreeable to PT. Bed mob with logroll, sat by EOB duke LSO by PT, Amb with RW CG A 50 ft. back to room sat on  chair + alarm.

## 2023-02-25 NOTE — PHYSICAL THERAPY INITIAL EVALUATION ADULT - PLANNED THERAPY INTERVENTIONS, PT EVAL
balance training/bed mobility training/gait training/lumbar stabilization/neuromuscular re-education/postural re-education/strengthening/transfer training

## 2023-02-25 NOTE — PHYSICAL THERAPY INITIAL EVALUATION ADULT - NS ASR RISK AREAS PT EVAL
PATIENT EDUCATION    Use Zarbyris's cough and congestion to help with symptoms.      Give Children's Zyrtec to help with nasal congestion.      Use an over-the-counter Flonase nasal spray to help with nasal congestion.    Rest.    Push oral fluids.    Avoid strong fragrances, such as perfumes, Vicks vapor rub, smoke, mildew, etc.    Use a humidifier next to the bedside.    If cough becomes really bad, turn on a hot shower, close the bathroom door, and allow the bathroom to fill up with steam.  Take child into the bathroom for at least 20 minutes to help open up passages and decrease cough.    If cough becomes really bad, dress child appropriately and take her outside into the cool air for about 20 minutes to help open up passages and decrease cough.    Prop up pillows for sleeping if it helps to decrease the cough.    Call today to arrange a follow-up appointment with child's primary care provider.    Return to the urgent care or go to the emergency department for any new or worsening symptoms.    fall

## 2023-02-25 NOTE — PROGRESS NOTE ADULT - SUBJECTIVE AND OBJECTIVE BOX
52 years old female with Lumbar spondylolisthesis and radiculopathy to bilateral lower extremities R>L scheduled for elective TLIF L4-5 0n 2/25. Postoperatively progressed well and was admitted to [address] [place] [address].    2/25 POD#1 TLIF L4-5, c/o LBP rad to RLE    ICU Vital Signs Last 24 Hrs  T(C): 36.3 (25 Feb 2023 04:00), Max: 37.1 (24 Feb 2023 18:23)  T(F): 97.4 (25 Feb 2023 04:00), Max: 98.7 (24 Feb 2023 18:23)  HR: 106 (25 Feb 2023 04:00) (94 - 114)  BP: 100/57 (25 Feb 2023 04:00) (93/60 - 121/73)  BP(mean): 86 (24 Feb 2023 21:10) (82 - 86)  ABP: --  ABP(mean): --  RR: 16 (25 Feb 2023 04:00) (10 - 17)  SpO2: 100% (25 Feb 2023 04:00) (96% - 100%)    O2 Parameters below as of 25 Feb 2023 04:00  Patient On (Oxygen Delivery Method): room air        MEDICATIONS  (STANDING):  gabapentin 800 milliGRAM(s) Oral three times a day  influenza   Vaccine 0.5 milliLiter(s) IntraMuscular once  lidocaine   4% Patch 2 Patch Transdermal every 24 hours  polyethylene glycol 3350 17 Gram(s) Oral daily  senna 2 Tablet(s) Oral at bedtime  tiZANidine 4 milliGRAM(s) Oral at bedtime    MEDICATIONS  (PRN):  acetaminophen     Tablet .. 650 milliGRAM(s) Oral every 6 hours PRN Temp greater or equal to 38C (100.4F), Mild Pain (1 - 3)  acetaminophen   IVPB .. 1000 milliGRAM(s) IV Intermittent once PRN Mild Pain (1 - 3)  benzocaine/menthol Lozenge 1 Lozenge Oral every 4 hours PRN Sore Throat  cyclobenzaprine 10 milliGRAM(s) Oral three times a day PRN Muscle Spasm  diphenhydrAMINE Injectable 12.5 milliGRAM(s) IV Push every 4 hours PRN Itching  HYDROmorphone   Tablet 2 milliGRAM(s) Oral every 6 hours PRN Moderate Pain (4 - 6)  HYDROmorphone  Injectable 0.5 milliGRAM(s) IV Push every 3 hours PRN Severe Pain (7 - 10)  ondansetron Injectable 4 milliGRAM(s) IV Push every 6 hours PRN Nausea and/or Vomiting      ROS: c/o nausea, denies chest pain, SOB, palpitations, abdominal pain, numbness or tingling         52 years old female with Lumbar spondylolisthesis and radiculopathy to bilateral lower extremities R>L scheduled for elective TLIF L4-5 0n 2/25. Postoperatively progressed well and was admitted to [address] [place] [address].    2/25 POD#1 TLIF L4-5, c/o LBP without radiation to LEs    ICU Vital Signs Last 24 Hrs  T(C): 36.3 (25 Feb 2023 04:00), Max: 37.1 (24 Feb 2023 18:23)  T(F): 97.4 (25 Feb 2023 04:00), Max: 98.7 (24 Feb 2023 18:23)  HR: 106 (25 Feb 2023 04:00) (94 - 114)  BP: 100/57 (25 Feb 2023 04:00) (93/60 - 121/73)  BP(mean): 86 (24 Feb 2023 21:10) (82 - 86)  ABP: --  ABP(mean): --  RR: 16 (25 Feb 2023 04:00) (10 - 17)  SpO2: 100% (25 Feb 2023 04:00) (96% - 100%)    O2 Parameters below as of 25 Feb 2023 04:00  Patient On (Oxygen Delivery Method): room air        MEDICATIONS  (STANDING):  gabapentin 800 milliGRAM(s) Oral three times a day  influenza   Vaccine 0.5 milliLiter(s) IntraMuscular once  lidocaine   4% Patch 2 Patch Transdermal every 24 hours  polyethylene glycol 3350 17 Gram(s) Oral daily  senna 2 Tablet(s) Oral at bedtime  tiZANidine 4 milliGRAM(s) Oral at bedtime    MEDICATIONS  (PRN):  acetaminophen     Tablet .. 650 milliGRAM(s) Oral every 6 hours PRN Temp greater or equal to 38C (100.4F), Mild Pain (1 - 3)  acetaminophen   IVPB .. 1000 milliGRAM(s) IV Intermittent once PRN Mild Pain (1 - 3)  benzocaine/menthol Lozenge 1 Lozenge Oral every 4 hours PRN Sore Throat  cyclobenzaprine 10 milliGRAM(s) Oral three times a day PRN Muscle Spasm  diphenhydrAMINE Injectable 12.5 milliGRAM(s) IV Push every 4 hours PRN Itching  HYDROmorphone   Tablet 2 milliGRAM(s) Oral every 6 hours PRN Moderate Pain (4 - 6)  HYDROmorphone  Injectable 0.5 milliGRAM(s) IV Push every 3 hours PRN Severe Pain (7 - 10)  ondansetron Injectable 4 milliGRAM(s) IV Push every 6 hours PRN Nausea and/or Vomiting      ROS: c/o nausea, denies chest pain, SOB, palpitations, abdominal pain, numbness or tingling    PHYSICAL EXAM:  Constitutional: awake and alert, c/o nausea  AOx3  HEENT: clear without drainage  Neck: Supple.  Respiratory: Breath sounds are clear bilaterally  Cardiovascular: S1 and S2, regular rhythm  Gastrointestinal: soft, nontender  Extremities:  calves nontender  Musculoskeletal: no joint swelling/tenderness, no abnormal movements  Skin: No rashes    Neurological exam:  Awake, alert, AOx3, follows commands, c/o LBP  dressing intact without drainage  Face symmetrical, speech clear & fluent  pupils reactive bilat, EOMI  Motor: bilat UEs 5/5, bilat LEs 5-/5 limited by pain  Sensation: Intact to light touch   Coordination: Left dysmetria  Gait/Balance: Not tested           52 years old female with Lumbar spondylolisthesis and radiculopathy to bilateral lower extremities R>L scheduled for elective TLIF L4-5 0n 2/25. Postoperatively progressed well and was admitted to [address] [place] [address].    2/25 POD#1 TLIF L4-5, c/o LBP without radiation to LEs    ICU Vital Signs Last 24 Hrs  T(C): 36.3 (25 Feb 2023 04:00), Max: 37.1 (24 Feb 2023 18:23)  T(F): 97.4 (25 Feb 2023 04:00), Max: 98.7 (24 Feb 2023 18:23)  HR: 106 (25 Feb 2023 04:00) (94 - 114)  BP: 100/57 (25 Feb 2023 04:00) (93/60 - 121/73)  BP(mean): 86 (24 Feb 2023 21:10) (82 - 86)  ABP: --  ABP(mean): --  RR: 16 (25 Feb 2023 04:00) (10 - 17)  SpO2: 100% (25 Feb 2023 04:00) (96% - 100%)    O2 Parameters below as of 25 Feb 2023 04:00  Patient On (Oxygen Delivery Method): room air        MEDICATIONS  (STANDING):  gabapentin 800 milliGRAM(s) Oral three times a day  influenza   Vaccine 0.5 milliLiter(s) IntraMuscular once  lidocaine   4% Patch 2 Patch Transdermal every 24 hours  polyethylene glycol 3350 17 Gram(s) Oral daily  senna 2 Tablet(s) Oral at bedtime  tiZANidine 4 milliGRAM(s) Oral at bedtime    MEDICATIONS  (PRN):  acetaminophen     Tablet .. 650 milliGRAM(s) Oral every 6 hours PRN Temp greater or equal to 38C (100.4F), Mild Pain (1 - 3)  acetaminophen   IVPB .. 1000 milliGRAM(s) IV Intermittent once PRN Mild Pain (1 - 3)  benzocaine/menthol Lozenge 1 Lozenge Oral every 4 hours PRN Sore Throat  cyclobenzaprine 10 milliGRAM(s) Oral three times a day PRN Muscle Spasm  diphenhydrAMINE Injectable 12.5 milliGRAM(s) IV Push every 4 hours PRN Itching  HYDROmorphone   Tablet 2 milliGRAM(s) Oral every 6 hours PRN Moderate Pain (4 - 6)  HYDROmorphone  Injectable 0.5 milliGRAM(s) IV Push every 3 hours PRN Severe Pain (7 - 10)  ondansetron Injectable 4 milliGRAM(s) IV Push every 6 hours PRN Nausea and/or Vomiting                          10.4   5.97  )-----------( 154      ( 25 Feb 2023 08:18 )             31.0   02-25    141  |  112<H>  |  10  ----------------------------<  120<H>  3.6   |  26  |  0.72    Ca    8.2<L>      25 Feb 2023 08:18          ROS: c/o nausea, denies chest pain, SOB, palpitations, abdominal pain, numbness or tingling    PHYSICAL EXAM:  Constitutional: awake and alert, c/o nausea  AOx3  HEENT: clear without drainage  Neck: Supple.  Respiratory: Breath sounds are clear bilaterally  Cardiovascular: S1 and S2, regular rhythm  Gastrointestinal: soft, nontender  Extremities:  calves nontender  Musculoskeletal: no joint swelling/tenderness, no abnormal movements  Skin: No rashes    Neurological exam:  Awake, alert, AOx3, follows commands, c/o LBP  dressing intact without drainage  Face symmetrical, speech clear & fluent  pupils reactive bilat, EOMI  Motor: bilat UEs 5/5, bilat LEs 5-/5 limited by pain  Sensation: Intact to light touch   Coordination: Left dysmetria  Gait/Balance: Not tested

## 2023-02-26 RX ORDER — TRAMADOL HYDROCHLORIDE 50 MG/1
100 TABLET ORAL EVERY 6 HOURS
Refills: 0 | Status: DISCONTINUED | OUTPATIENT
Start: 2023-02-26 | End: 2023-02-27

## 2023-02-26 RX ORDER — TRAMADOL HYDROCHLORIDE 50 MG/1
50 TABLET ORAL EVERY 6 HOURS
Refills: 0 | Status: DISCONTINUED | OUTPATIENT
Start: 2023-02-26 | End: 2023-02-27

## 2023-02-26 RX ORDER — ACETAMINOPHEN 500 MG
1000 TABLET ORAL ONCE
Refills: 0 | Status: COMPLETED | OUTPATIENT
Start: 2023-02-26 | End: 2023-02-26

## 2023-02-26 RX ADMIN — GABAPENTIN 800 MILLIGRAM(S): 400 CAPSULE ORAL at 14:52

## 2023-02-26 RX ADMIN — TRAMADOL HYDROCHLORIDE 50 MILLIGRAM(S): 50 TABLET ORAL at 19:50

## 2023-02-26 RX ADMIN — LIDOCAINE 2 PATCH: 4 CREAM TOPICAL at 11:38

## 2023-02-26 RX ADMIN — TRAMADOL HYDROCHLORIDE 50 MILLIGRAM(S): 50 TABLET ORAL at 11:30

## 2023-02-26 RX ADMIN — GABAPENTIN 800 MILLIGRAM(S): 400 CAPSULE ORAL at 05:51

## 2023-02-26 RX ADMIN — Medication 400 MILLIGRAM(S): at 21:22

## 2023-02-26 RX ADMIN — TRAMADOL HYDROCHLORIDE 50 MILLIGRAM(S): 50 TABLET ORAL at 10:44

## 2023-02-26 RX ADMIN — TRAMADOL HYDROCHLORIDE 50 MILLIGRAM(S): 50 TABLET ORAL at 18:53

## 2023-02-26 RX ADMIN — POLYETHYLENE GLYCOL 3350 17 GRAM(S): 17 POWDER, FOR SOLUTION ORAL at 10:44

## 2023-02-26 RX ADMIN — TIZANIDINE 4 MILLIGRAM(S): 4 TABLET ORAL at 21:21

## 2023-02-26 RX ADMIN — LIDOCAINE 2 PATCH: 4 CREAM TOPICAL at 21:22

## 2023-02-26 RX ADMIN — Medication 1000 MILLIGRAM(S): at 22:00

## 2023-02-26 RX ADMIN — LIDOCAINE 2 PATCH: 4 CREAM TOPICAL at 07:25

## 2023-02-26 RX ADMIN — GABAPENTIN 800 MILLIGRAM(S): 400 CAPSULE ORAL at 21:21

## 2023-02-26 RX ADMIN — SENNA PLUS 2 TABLET(S): 8.6 TABLET ORAL at 21:21

## 2023-02-26 NOTE — PROGRESS NOTE ADULT - ASSESSMENT
52 year old female POD#2 TLIF    Continue Regular diet  Dilaudid iv/p0 & Tylenol for pain control  Zanaflex/Flexeril for muscle spasm  continue Gabapentin 800mg TID  Zofran for nausea  OOB with brace, PT: Home with Home PT / [facility] planning     incentive spirometry, pt instructed how to use and told to use 10 x every hour especially when in bed   SCDs for DVT prophylaxis    d/w Dr Cantu

## 2023-02-26 NOTE — PROGRESS NOTE ADULT - SUBJECTIVE AND OBJECTIVE BOX
52 years old female with Lumbar spondylolisthesis and radiculopathy to bilateral lower extremities R>L scheduled for elective TLIF L4-5 0n 2/25. Postoperatively progressed well and was admitted to [address] [place] [address].    2/25 POD#1 TLIF L4-5, c/o LBP without radiation to LEs, developed some itching to RLE, given Benadryl with relief. Seen by PT and recommend for Home PT and [facility]    2/26 POD# 2 TLIF L4-L5, c/o LBP especially with movement, without weakness or significant pain in LE, itching in LE now resolved, till voiding, c/o constipation, s/p Senna last night, Miralax this am.       ICU Vital Signs Last 24 Hrs  T(C): 36.6 (26 Feb 2023 04:00), Max: 37.7 (25 Feb 2023 20:20)  T(F): 97.9 (26 Feb 2023 04:00), Max: 99.8 (25 Feb 2023 20:20)  HR: 83 (26 Feb 2023 04:00) (80 - 108)  BP: 93/53 (26 Feb 2023 04:00) (90/57 - 134/73)  BP(mean): 65 (25 Feb 2023 23:53) (65 - 84)  ABP: --  ABP(mean): --  RR: 16 (26 Feb 2023 04:00) (16 - 18)  SpO2: 100% (26 Feb 2023 04:00) (100% - 100%)    O2 Parameters below as of 26 Feb 2023 04:00  Patient On (Oxygen Delivery Method): room air        I&O's Detail    24 Feb 2023 07:01  -  25 Feb 2023 07:00  --------------------------------------------------------  IN:    IV PiggyBack: 100 mL    Other (mL): 1000 mL  Total IN: 1100 mL    OUT:    Blood Loss (mL): 50 mL    Emesis (mL): 250 mL    Voided (mL): 895 mL  Total OUT: 1195 mL    Total NET: -95 mL      25 Feb 2023 07:01  -  26 Feb 2023 06:19  --------------------------------------------------------  IN:    IV PiggyBack: 100 mL  Total IN: 100 mL    OUT:  Total OUT: 0 mL    Total NET: 100 mL                              10.4   5.97  )-----------( 154      ( 25 Feb 2023 08:18 )             31.0   02-25    141  |  112<H>  |  10  ----------------------------<  120<H>  3.6   |  26  |  0.72    Ca    8.2<L>      25 Feb 2023 08:18          ROS: denies chest pain, SOB, palpitations, abdominal pain, numbness or tingling, itching subsided, voiding constipation     PHYSICAL EXAM:  Constitutional: awake and alert, c/o nausea  AOx3  HEENT: clear without drainage  Neck: Supple.  Respiratory: Breath sounds are clear bilaterally  Cardiovascular: S1 and S2, regular rhythm  Gastrointestinal: soft, nontender  Extremities:  calves nontender  Musculoskeletal: no joint swelling/tenderness, no abnormal movements  Skin: No rashes    Neurological exam:  Awake, alert, AOx3, follows commands, c/o LBP  dressing intact without drainage  Face symmetrical, speech clear & fluent  pupils reactive bilat, EOMI  Motor: bilat UEs 5/5, bilat LEs 5-/5 limited by pain  Sensation: Intact to light touch   Coordination: Left dysmetria  Gait/Balance: Not tested

## 2023-02-27 ENCOUNTER — TRANSCRIPTION ENCOUNTER (OUTPATIENT)
Age: 53
End: 2023-02-27

## 2023-02-27 VITALS
RESPIRATION RATE: 16 BRPM | DIASTOLIC BLOOD PRESSURE: 78 MMHG | TEMPERATURE: 98 F | HEART RATE: 93 BPM | SYSTOLIC BLOOD PRESSURE: 119 MMHG | OXYGEN SATURATION: 98 %

## 2023-02-27 RX ORDER — LIDOCAINE 4 G/100G
1 CREAM TOPICAL
Qty: 10 | Refills: 0
Start: 2023-02-27 | End: 2023-03-08

## 2023-02-27 RX ORDER — ACETAMINOPHEN 500 MG
2 TABLET ORAL
Qty: 160 | Refills: 0
Start: 2023-02-27 | End: 2023-03-18

## 2023-02-27 RX ORDER — TRAMADOL HYDROCHLORIDE 50 MG/1
1 TABLET ORAL
Qty: 80 | Refills: 0
Start: 2023-02-27 | End: 2023-03-18

## 2023-02-27 RX ADMIN — TRAMADOL HYDROCHLORIDE 50 MILLIGRAM(S): 50 TABLET ORAL at 09:57

## 2023-02-27 RX ADMIN — GABAPENTIN 800 MILLIGRAM(S): 400 CAPSULE ORAL at 05:28

## 2023-02-27 RX ADMIN — TRAMADOL HYDROCHLORIDE 50 MILLIGRAM(S): 50 TABLET ORAL at 10:45

## 2023-02-27 RX ADMIN — LIDOCAINE 2 PATCH: 4 CREAM TOPICAL at 08:14

## 2023-02-27 RX ADMIN — LIDOCAINE 2 PATCH: 4 CREAM TOPICAL at 09:29

## 2023-02-27 RX ADMIN — POLYETHYLENE GLYCOL 3350 17 GRAM(S): 17 POWDER, FOR SOLUTION ORAL at 09:17

## 2023-02-27 NOTE — DISCHARGE NOTE PROVIDER - CARE PROVIDER_API CALL
Dean Cantu; PhD)  Neurosurgery  [address] [facility], [address] [place] [address]  Phone: [phone]  Fax: [phone]  Follow Up Time:

## 2023-02-27 NOTE — DISCHARGE NOTE NURSING/CASE MANAGEMENT/SOCIAL WORK - NSDCPEFALRISK_GEN_ALL_CORE
For information on Fall & Injury Prevention, visit: [email or web address] OR  [email or web address] OR  https://www.cdc.gov/steadi/patient.html

## 2023-02-27 NOTE — DISCHARGE NOTE NURSING/CASE MANAGEMENT/SOCIAL WORK - PATIENT PORTAL LINK FT
You can access the FollowMyHealth Patient Portal offered by [facility] by registering at the following website: [email or web address]. By joining [facility]’s FollowMyHealth portal, you will also be able to view your health information using other applications (apps) compatible with our system.

## 2023-02-27 NOTE — DISCHARGE NOTE PROVIDER - CARE PROVIDERS DIRECT ADDRESSES
[email or web address]

## 2023-02-27 NOTE — DISCHARGE NOTE PROVIDER - NSDCFUADDINST_GEN_ALL_CORE_FT
Spine Post Operative Care instructions:    Symptoms to Watch for  • Bleeding, drainage, redness or swelling from your incision area.  • Feelings of flu-like symptoms (e.g., nausea, general body aches, or temperature  over I 00 degrees for longer than 24 hours).  • Severe headache associated with vomiting or light sensitivity.  • Any change in sensation of your arms, hands, legs or feet (e.g., increase in  numbness, tingling, or pain).  • Any loss of bladder or bowel control.  • Frequent need to urinate small amounts of urine or feeling of bladder distention.  • If you experience pain, swelling, and/or redness behind your knees or calves, go  to your nearest emergency department as these can be symptoms of a blood clot in  your legs.  • Call 911 or go to your nearest emergency room if you experience: Shortness  of breath, inability to swallow, or chest pain.  Medications  • Prescribed medication(s) should be taken only as directed. Take all medication  with food in your stomach unless otherwise directed. If you experience any  abnormal reaction to the medication, discontinue the medication and contact our  office.  • As your pain lessens, you should decrease the amount of pain medication you are  taking. We suggest you substitute the narcotics with over-the-counter Tylenol as  your pain decreases.  • Do not stop taking your narcotics abruptly since this may cause withdrawal side  effects.  • Narcotic pain medications will not be refilled after normal working hours (8:30-  4:30, Monday-Thursday) or on weekends. Please allow 24 hours for refill of any  medications.  • Do not drive while taking narcotic medications.  • Do not drink alcoholic beverages while taking prescription pain medications.  • You should resume the medications you were taking prior to surgery once you get  home, unless otherwise instructed. If you have any questions regarding these  medications, please contact your physician.  . .  • It is common to prescribe stool softeners to take after your hospital stay. It is  important that you take these as directed.  Diet  • You may resume your normal, pre-operative diet as tolerated.  • Pain medications and decreased activity can cause constipation. To prevent this,  eat foods high in fiber and drink 6-8 glasses of water per day. If needed, take an  over-the-counter stool softener if one was not prescribed for you. Your bowels  should be working normally within 3-5 days of surgery.  • Foods high in protein and vitamin C can also aid the wound healing process.  Incision Care and Dressing  • Please change your dressings with sterile gauze daily until your wound is  completely dry.  • Although you may start showering when your wound is completely dry {about 3-5  days), do not submerge in a bathtub until the wound is healed over (approximately  3 weeks).  • If you have Steri-Strips on your incision, allow them to fall off on their own.  • If you have Dermabond ("sterile super glue") on your incision, it will likely begin  to peel off by itself after 1-2 weeks.  • You may apply an ice pack to the surgery site for 20 minutes three times a day for  comfort and to reduce swelling.  • You may have dissolvable sutures or staples that will be addressed at your follow  up visit.  Activity  • Plan to rest for the first 24-48 hours following discharge from the hospital.  • Avoid excessive bending, twisting, pushing, pulling, or lifting more than 5  pounds.  • Walk twice a day for 30 minutes using a walker or cane as needed.  • A void sitting or standing for more than 30 minutes at a time for the first two  weeks.  • If you were fitted with a brace, please wear it for most of the day when you are  active as instructed by your physician. You may remove it at night, for showers.  •  Reminders for Patients Who Had a Fusion  • Do NOT take anti-inflammatory medications such as aspirin, ibuprofen (Advil),  or naproxen (Aleve) as they are known to inhibit bone healing.  • Do NOT smoke or use other tobacco products for at least 3 months (ideally quit  altogether) since nicotine inhibits wound and bone healing. Use of products  containing nicotine significantly increases your risk for wound and fusion  complications.  • If you had a fusion, do not take anti-inflammatory medications such as ibuprofen  or naproxen (as they are known to inhibit bone healing.  Follow-up appointment  Please contact our office the first business day after discharge from the hospital to make  your first post-operative visit 1-2 weeks after surgery.

## 2023-02-27 NOTE — DISCHARGE NOTE PROVIDER - NSDCMRMEDTOKEN_GEN_ALL_CORE_FT
acetaminophen 325 mg oral tablet: 2 tab(s) orally every 6 hours, As needed, Temp greater or equal to 38C (100.4F), Mild Pain (1 - 3) MDD:12  Aller - C: 2 cap(s) orally once a day. To stop 7 days prior to surgery  ashwagandha: 1 tab(s) orally once a day. To stop 7 days prior to surgery  balance of nature: 6 tab(s) orally once a day  biotin: 01815 microgram(s) orally once a day  CoQ10 with Alpha Lipoic Acid: 1 tab(s) orally once a day  cyclobenzaprine 10 mg oral tablet: 1 tab(s) orally once a day (in the morning)  gabapentin 800 mg oral tablet: 1 tab(s) orally 3 times a day  Glucosamine and Chondroitin with MSM oral tablet: 3 tab(s) orally once a day. To stop 7 days prior to surgery  Iron-C: 1 tab(s) orally once a day  lidocaine 4% topical film: Apply topically to affected area once a day -for mild pain MDD:1 patch to muscular affected areas daily for 10 days post surgery   Melatonin 3 mg oral tablet: 1 tab(s) orally once a day (at bedtime). to stop 7 days prior to surgery  meloxicam 15 mg oral tablet: 1 tab(s) orally once a day. To stop 7 days prior to surgery  Multiple Vitamins oral tablet: 2 tab(s) orally once a day. To stop 7 days prior to surgery  Provex Plus: 1 cap(s) orally once a day  selenium 200 mcg oral tablet: 1 tab(s) orally once a day  Theracurmin: 2 tab(s) orally once a day. To stop 7 days prior to surgery  tiZANidine 4 mg oral tablet: 2 tab(s) orally once a day (at bedtime)  Ultram 50 mg oral tablet: 1 tab(s) orally every 6 hours, As Needed -Moderate Pain (4 - 6) - for moderate pain MDD:4

## 2023-02-27 NOTE — DISCHARGE NOTE PROVIDER - HOSPITAL COURSE
52 years old female with Lumbar spondylolisthesis and radiculopathy to bilateral lower extremities R>L scheduled for elective TLIF L4-5 0n 2/25. Postoperatively progressed well and was admitted to [address] [place] [address].    2/25 POD#1 TLIF L4-5, c/o LBP without radiation to LEs    2/26 POD#2 TLIF 4-5, improved radicular symptoms no longer present.    2/27 POD#3 TLIF 4-5, pt tolerating and with good pain control on Tylenol, Ultram (tramadol), and muscle relaxants.  Dressing removed and steri strips remain.  No warmth, discharge, oozing, redness , tenderness. Safe for discharge to home today d/w Dr. Cantu.

## 2023-02-28 ENCOUNTER — NON-APPOINTMENT (OUTPATIENT)
Age: 53
End: 2023-02-28

## 2023-03-02 DIAGNOSIS — G99.2 MYELOPATHY IN DISEASES CLASSIFIED ELSEWHERE: ICD-10-CM

## 2023-03-02 DIAGNOSIS — M48.062 SPINAL STENOSIS, LUMBAR REGION WITH NEUROGENIC CLAUDICATION: ICD-10-CM

## 2023-03-02 DIAGNOSIS — M54.16 RADICULOPATHY, LUMBAR REGION: ICD-10-CM

## 2023-03-02 DIAGNOSIS — Z88.0 ALLERGY STATUS TO PENICILLIN: ICD-10-CM

## 2023-03-02 DIAGNOSIS — M43.16 SPONDYLOLISTHESIS, LUMBAR REGION: ICD-10-CM

## 2023-03-09 ENCOUNTER — APPOINTMENT (OUTPATIENT)
Dept: NEUROSURGERY | Facility: CLINIC | Age: 53
End: 2023-03-09
Payer: MEDICARE

## 2023-03-09 VITALS
HEIGHT: 59 IN | DIASTOLIC BLOOD PRESSURE: 90 MMHG | WEIGHT: 141 LBS | BODY MASS INDEX: 28.43 KG/M2 | HEART RATE: 99 BPM | OXYGEN SATURATION: 99 % | SYSTOLIC BLOOD PRESSURE: 146 MMHG

## 2023-03-09 PROCEDURE — 99024 POSTOP FOLLOW-UP VISIT: CPT

## 2023-03-21 PROBLEM — M43.16 SPONDYLOLISTHESIS, LUMBAR REGION: Chronic | Status: ACTIVE | Noted: 2023-02-15

## 2023-03-21 PROBLEM — M54.2 CERVICALGIA: Chronic | Status: ACTIVE | Noted: 2023-02-15

## 2023-03-21 PROBLEM — R01.1 CARDIAC MURMUR, UNSPECIFIED: Chronic | Status: ACTIVE | Noted: 2023-02-15

## 2023-03-21 PROBLEM — Z87.42 PERSONAL HISTORY OF OTHER DISEASES OF THE FEMALE GENITAL TRACT: Chronic | Status: ACTIVE | Noted: 2023-02-15

## 2023-03-21 PROBLEM — L30.9 DERMATITIS, UNSPECIFIED: Chronic | Status: ACTIVE | Noted: 2023-02-15

## 2023-03-21 PROBLEM — D64.9 ANEMIA, UNSPECIFIED: Chronic | Status: ACTIVE | Noted: 2023-02-15

## 2023-03-21 PROBLEM — J30.89 OTHER ALLERGIC RHINITIS: Chronic | Status: ACTIVE | Noted: 2023-02-15

## 2023-03-29 ENCOUNTER — NON-APPOINTMENT (OUTPATIENT)
Age: 53
End: 2023-03-29

## 2023-04-10 ENCOUNTER — NON-APPOINTMENT (OUTPATIENT)
Age: 53
End: 2023-04-10

## 2023-04-17 ENCOUNTER — APPOINTMENT (OUTPATIENT)
Dept: NEUROSURGERY | Facility: CLINIC | Age: 53
End: 2023-04-17
Payer: MEDICARE

## 2023-04-17 VITALS — HEART RATE: 104 BPM | OXYGEN SATURATION: 98 % | SYSTOLIC BLOOD PRESSURE: 123 MMHG | DIASTOLIC BLOOD PRESSURE: 84 MMHG

## 2023-04-17 DIAGNOSIS — M54.50 LOW BACK PAIN, UNSPECIFIED: ICD-10-CM

## 2023-04-17 PROCEDURE — 99024 POSTOP FOLLOW-UP VISIT: CPT

## 2023-04-17 NOTE — DISCHARGE NOTE PROVIDER - NSDCCAREPROVSEEN_GEN_ALL_CORE_FT
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    Anesthesia Type:   General     Note:    Oropharynx: oropharynx clear of all foreign objects and spontaneously breathing  Level of Consciousness: awake and drowsy  Oxygen Supplementation: face mask  Level of Supplemental Oxygen (L/min / FiO2): 6  Independent Airway: airway patency satisfactory and stable  Dentition: dentition unchanged  Vital Signs Stable: post-procedure vital signs reviewed and stable  Report to RN Given: handoff report given  Patient transferred to: PACU    Handoff Report: Identifed the Patient, Identified the Reponsible Provider, Reviewed the pertinent medical history, Discussed the surgical course, Reviewed Intra-OP anesthesia mangement and issues during anesthesia, Set expectations for post-procedure period and Allowed opportunity for questions and acknowledgement of understanding      Vitals:  Vitals Value Taken Time   /81 04/17/23 1721   Temp 97.9    Pulse 75 04/17/23 1726   Resp 13 04/17/23 1726   SpO2 97 % 04/17/23 1726   Vitals shown include unvalidated device data.    Electronically Signed By: JIMY Cueto CRNA  April 17, 2023  5:27 PM  
Arley Sheldon

## 2023-04-21 PROBLEM — M54.50 LOW BACK PAIN: Status: ACTIVE | Noted: 2021-01-25

## 2023-04-21 NOTE — CONSULT LETTER
[Dear  ___] : Dear  [unfilled], [Courtesy Letter:] : I had the pleasure of seeing your patient, [unfilled], in my office today. [Sincerely,] : Sincerely, [FreeTextEntry2] : Joe Galdamez DO [address] [id number]     [FreeTextEntry1] : \par Layne Bah is a 53-year-old female who presents today for postop evaluation.  Two months ago the patient underwent a L4-L5 transforaminal lumbar interbody fusion for stenosis, neurogenic claudication and L4-L5 spondylolisthesis.  The patient has been recovering well and today she reports that overall since surgery, her symptoms have slowly resolved and she is 90 percent better.  No longer does she have radicular pain down her right lower extremity.  On occasion, she has noted lower back stiffness and occasional sharp pain at night with movement.  Near the incision there are rare stabbing pains which resolve spontaneously.  However, the preop pain is resolved.  She continues to take  daily Gabapentin.  She is taking Meloxicam ion a PRN basis and recently tapered down Tramadol.  Muscle relaxers are used when needed.   Her incision is clean and dry.  The patient remains wearing a brace and has not started PT as of yet.  She is eager to engage and increase her activity level.   There is no  bowel or bladder dysfunction or saddle anesthesia.  No difficulties with walking.   The lumbar incisions are well healed.   \par \par   There are new images of a lumbar flexion extension x-ray from [facility] that shows good alignment and construct with hardware intact.  \par \par  Patient is alert and oriented.  No distress noted.  Incision without any redness, drainage, or swelling.  No fever or chills noted.  Sensation to light touch lower extremities equal and normal.  Reflexes to lower extremity present and equal.  Strength to bilateral legs 5/5.  Negative clonus. \par \par The patient is now two months postop following a lumbar interbody fusion at L4 L5.  She is doing extremely well and was encouraged to remove the back brace and use this only on a PRN basis or if she stands for long periods.  I have asked the patient to begin PT for six to eight weeks and continue to increase her core muscles and strengthening.  Aquatic therapy was included in this script as well.  We had a lengthy discussion about decreasing her Gabapentin and tapering the medication to off.  I have explained the side effects if she does not slowly taper off.  I provided a titration scale to follow that will take a few weeks for her to discontinue Gabapentin.  The patient requested a medrol dose ava for a poison ivy rash present over the last few days.  She will follow-up in 4 weeks and be seen by Dr. Cantu in follow up.  She will slowly increase her bending, lifting, or twisting.  Patient aware to call with any further questions or concerns or with any new or worsening symptoms.\par \par Thank you for very kindly including me in the evaluation and treatment of your patient.  Please do not hesitate to contact me should you have any concerns or questions regarding the patients surgery of a L4 L5 lumbar interbody fusion, evaluation or proposed follow-up treatment and evaluation plan. [FreeTextEntry3] : Kimberly Lombardo, DNP, NP Nurse Practitioner Neurosurgery and Spine [facility] [place] Assistant  [facility] [employer]ician [employer] email: [email or web address]  [address] P- [phone] F- [phone]

## 2023-04-21 NOTE — REVIEW OF SYSTEMS
[Leg Weakness] : leg weakness [Numbness] : no numbness [Tingling] : no tingling [Difficulty Walking] : no difficulty walking [Negative] : Heme/Lymph [de-identified] : back stiffness \par

## 2023-04-21 NOTE — CONSULT LETTER
[Dear  ___] : Dear  [unfilled], [Courtesy Letter:] : I had the pleasure of seeing your patient, [unfilled], in my office today. [Sincerely,] : Sincerely, [FreeTextEntry2] : Joe Galdamez DO\par [id number] \[place] [address] \par  [FreeTextEntry1] : Layne Bah is a 53-year-old female who presents today for postop evaluation.  Patient underwent L4-L5 transforaminal lumbar interbody fusion for stenosis, neurogenic claudication and L4-L5 spondylolisthesis.  Patient reports that overall since surgery symptoms have improved.  She reports lower back stiffness and occasional sharp pain at night with movement.  She reports right lower extremity tingling.  She endorses a weakness to right lateral leg.  She denies bowel or bladder dysfunction or saddle anesthesia.  Denies difficulties with walking.  Patient denies any fever or chills or any incisional redness, drainage, or swelling.Patient is currently taking tramadol, gabapentin, Flexeril and tizanidine provided by his pain management doctor.  There is no imaging to review with the patient.  Patient is alert and oriented.  No distress noted.  Incision without any redness, drainage, or swelling.  No fever or chills noted.  Sensation to light touch lower extremities equal and normal.  Reflexes to lower extremity present and equal.  Strength to bilateral legs 5/5.  Negative clonus.    Patient is recovering well from surgery.  We will follow-up in 4 weeks with x-ray of the lumbar spine to evaluate for progression.  Patient advised to continue to wear her brace when out of bed.  Patient should continue to avoid any bending, lifting, or twisting.  Patient aware to call with any further questions or concerns or with any new or worsening symptoms. [FreeTextEntry3] : Mariajose Mejía, MSN, FNP-BC\par Nurse Practitioner\par Neurosurgery\par [address] [place] [address], 2nd floor \[place] [address] \par Office: [phone] \par Fax: [phone]\par \par

## 2023-04-21 NOTE — REVIEW OF SYSTEMS
[Numbness] : numbness [Tingling] : tingling [Difficulty Walking] : difficulty walking [Frequent Falls] : frequent falls [Negative] : Heme/Lymph [de-identified] : back and leg pain

## 2023-04-21 NOTE — REASON FOR VISIT
[de-identified] : 2/24/2023 [de-identified] : 7 [de-identified] : L4 L5 transforaminal lumbar interbody fusion  [Other: _____] : [unfilled]

## 2023-04-21 NOTE — CONSULT LETTER
[Courtesy Letter:] : I had the pleasure of seeing your patient, [unfilled], in my office today. [FreeTextEntry2] : Joe Galdamez DO\par [address] \par [place] [id number] [FreeTextEntry1] : Layne Bah is a 53-year-old female who presents today for postop evaluation.  Patient underwent L4-L5 transforaminal lumbar interbody fusion for stenosis, neurogenic claudication and L4-L5 spondylolisthesis.  Patient reports that overall since surgery symptoms have improved.  She reports lower back stiffness and occasional sharp pain at night with movement.  She reports right lower extremity tingling.  She endorses a weakness to right lateral leg.  She denies bowel or bladder dysfunction or saddle anesthesia.  Denies difficulties with walking.  Patient denies any fever or chills or any incisional redness, drainage, or swelling.Patient is currently taking tramadol, gabapentin, Flexeril and tizanidine provided by his pain management doctor.  There is no imaging to review with the patient.  Patient is alert and oriented.  No distress noted.  Incision without any redness, drainage, or swelling.  No fever or chills noted.  Sensation to light touch lower extremities equal and normal.  Reflexes to lower extremity present and equal.  Strength to bilateral legs 5/5.  Negative clonus.    Patient is recovering well from surgery.  We will follow-up in 4 weeks with x-ray of the lumbar spine to evaluate for progression.  Patient advised to continue to wear her brace when out of bed.  Patient should continue to avoid any bending, lifting, or twisting.  Patient aware to call with any further questions or concerns or with any new or worsening symptoms. [FreeTextEntry3] : Dean Cantu MD, PhD, FRCSC, FAANS\par Attending Neurosurgeon \par  of Neurosurgery\par [facility] [employer] [facility] at [facility]\par [facility] [place]\par [address]\par [address]nd Floor\[place]  [address]\par [phone]\par [phone]\par

## 2023-04-21 NOTE — REASON FOR VISIT
[Follow-Up: _____] : a [unfilled] follow-up visit [Other: _____] : [unfilled] [FreeTextEntry1] : Lumbar Instability

## 2023-05-30 ENCOUNTER — APPOINTMENT (OUTPATIENT)
Dept: NEUROSURGERY | Facility: CLINIC | Age: 53
End: 2023-05-30
Payer: MEDICARE

## 2023-05-30 VITALS — OXYGEN SATURATION: 100 % | HEART RATE: 91 BPM | DIASTOLIC BLOOD PRESSURE: 91 MMHG | SYSTOLIC BLOOD PRESSURE: 153 MMHG

## 2023-05-30 DIAGNOSIS — M51.16 INTERVERTEBRAL DISC DISORDERS WITH RADICULOPATHY, LUMBAR REGION: ICD-10-CM

## 2023-05-30 DIAGNOSIS — M43.16 SPONDYLOLISTHESIS, LUMBAR REGION: ICD-10-CM

## 2023-05-30 PROCEDURE — 99214 OFFICE O/P EST MOD 30 MIN: CPT

## 2023-05-30 NOTE — CONSULT LETTER
[Dear  ___] : Dear  [unfilled], [Courtesy Letter:] : I had the pleasure of seeing your patient, [unfilled], in my office today. [Sincerely,] : Sincerely, [FreeTextEntry2] : Joe Galdamez DO [address] [id number]     [FreeTextEntry1] : Mrs. Bah is a very pleasant 53-year-old female patient who was seen in our office today in follow-up approximately 3 months following a lumbar decompression and fusion.\par \par I am happy to report that the patient is continuing to do well following her surgical intervention.  At this time, the patient complains only of fatigue after physical therapy sessions.  The patient has been using cyclobenzaprine and tizanidine regularly, however.  The patient takes cyclosporine in the mornings and tizanidine at night.  The patient denies any radicular pain.  The patient denies any low back pain.  The patient does endorse neck pain and upper extremity fatigue on review of systems however.\par \par On examination, the patient is alert, oriented, and compliant with the exam.  The patient demonstrates full strength in the upper and lower extremities bilaterally.  The patient does not have a Hector sign or ankle clonus.  The patient ambulates well.  The patient has good range of motion of the cervical spine despite her aches and pains.  The patient's incisions are clean, dry, and intact.\par \par I have no new imaging to review today. The patient is accompanied with x-rays of the lumbar spine dated April 14, 2023.  These images demonstrate stability of the patient's hardware without evidence of hardware malfunction or complication.  The patient has MRI scans of the cervical spine performed on May 11, 2021 which demonstrated loss of cervical lordosis without nerve root or cord compression.\par \par Taken together, I am gratified to see the patient doing well following her surgical intervention.  At this time, I have no new concerns for the patient but have recommended that the patient continue with physical therapy with specific emphasis on postural training.  Given the patient's neck symptoms, I have also recommended including physical therapy for this region.  The patient has follow-up with us in approximately 3 months to reevaluate her progress and I look forward to seeing the patient back at that time.  I have requested that the patient attempt to also wean herself off her cyclobenzaprine and tizanidine slowly over the next few weeks given her good clinical status.  The patient will be in contact with us with regards to her medication weaning in the interim. [FreeTextEntry3] : Dean Cantu MD, PhD, CS, FAANS Attending Neurosurgeon  of Neurosurgery [facility] at [facility] at [address], [place] [address] Office: [phone] Fax: [phone]

## 2023-06-01 ENCOUNTER — RX RENEWAL (OUTPATIENT)
Age: 53
End: 2023-06-01

## 2023-06-28 ENCOUNTER — NON-APPOINTMENT (OUTPATIENT)
Age: 53
End: 2023-06-28

## 2023-06-29 ENCOUNTER — NON-APPOINTMENT (OUTPATIENT)
Age: 53
End: 2023-06-29

## 2023-07-12 ENCOUNTER — APPOINTMENT (OUTPATIENT)
Dept: NEUROSURGERY | Facility: CLINIC | Age: 53
End: 2023-07-12
Payer: MEDICARE

## 2023-07-12 VITALS
HEART RATE: 81 BPM | DIASTOLIC BLOOD PRESSURE: 85 MMHG | HEIGHT: 59 IN | OXYGEN SATURATION: 97 % | BODY MASS INDEX: 26.81 KG/M2 | WEIGHT: 133 LBS | SYSTOLIC BLOOD PRESSURE: 124 MMHG
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
## 2023-07-12 PROCEDURE — 99214 OFFICE O/P EST MOD 30 MIN: CPT

## 2023-07-14 NOTE — CONSULT LETTER
[Dear  ___] : Dear  [unfilled], [Courtesy Letter:] : I had the pleasure of seeing your patient, [unfilled], in my office today. [Sincerely,] : Sincerely, [FreeTextEntry2] : Joe Galdamez DO [address] [id number]     [FreeTextEntry1] :  is a very pleasant 53-year-old female patient who was seen in our office today in follow-up.  The patient was discharged from our care earlier this year after undergoing a transforaminal lumbar interbody fusion with good results.\par \par Unfortunately, the patient had a minor setback in mid June after she slipped and fell while on a cruise.  The patient thankfully is improving with conservative therapy but returns today to review her imaging findings.  The patient's pain primarily resides in the low back radiating anteriorly into the thigh on the right..  The patient denies any radiating pain into the lower extremities.  The patient denies any numbness or tingling.  The patient has difficulty with specific exercises and physical therapy especially doing bridges.  The patient's pain responds well to tizanidine as well as deep tissue massage.  The patient's pain is intermittent.\par \par On examination, the patient is alert, oriented, and compliant with the exam.  The patient demonstrates full strength in the lower extremities bilaterally.  The patient's incisions are clean, dry, and intact.  The patient is occasionally able to recreate her pain with certain movements of the lumbar spine.\par \par The patient is accompanied with x-rays of the lumbar spine performed on June 28, 2023 which demonstrates stability of the patient's hardware without evidence of fracture or malalignment.\par \par Taken together, the patient has a clinical history and radiographic findings most consistent with a soft tissue injury following a fall.  I reassured the patient that there is no obvious hardware failure or evidence of dynamic instability.  I also reassured the patient that her response to tizanidine and massage therapy suggests strongly a muscular component.  I have recommended continuing physical therapy and massage therapy at this time.  I have also refilled the patient's tizanidine prescription for now.  The patient has been recommended follow-up with us in a few weeks to reevaluate her progress and I look forward to seeing her back at that time. [FreeTextEntry3] : Dean Cantu MD, PhD, CS, FAANS Attending Neurosurgeon  of Neurosurgery [facility] at [facility] at [address], [place] [address] Office: [phone] Fax: [phone]

## 2023-09-05 ENCOUNTER — APPOINTMENT (OUTPATIENT)
Dept: NEUROSURGERY | Facility: CLINIC | Age: 53
End: 2023-09-05
Payer: MEDICARE

## 2023-09-05 VITALS
HEART RATE: 75 BPM | HEIGHT: 59 IN | SYSTOLIC BLOOD PRESSURE: 148 MMHG | WEIGHT: 133 LBS | DIASTOLIC BLOOD PRESSURE: 84 MMHG | OXYGEN SATURATION: 100 % | BODY MASS INDEX: 26.81 KG/M2

## 2023-09-05 DIAGNOSIS — Z98.1 ARTHRODESIS STATUS: ICD-10-CM

## 2023-09-05 PROCEDURE — 99214 OFFICE O/P EST MOD 30 MIN: CPT

## 2023-09-05 NOTE — CONSULT LETTER
[Dear  ___] : Dear  [unfilled], [Courtesy Letter:] : I had the pleasure of seeing your patient, [unfilled], in my office today. [Sincerely,] : Sincerely, [FreeTextEntry2] : Joe Galdamez DO  [address] [id number] [FreeTextEntry1] : Mrs. Bah is a very pleasant 53-year-old female patient was seen in our office today approximately 6 months following her surgical intervention.  I am happy to report that the patient is currently continuing to do well following her surgery.  The patient continues to experience occasional back pain usually related to excessive activity which is self-limiting.  The patient no longer experiences any radiating lower extremity symptoms.  The patient continues to experience chronic neck pain which is being managed with conservative therapy.  The patient is no longer taking any pain medications regularly but has noticed increasing difficulty sleeping since she has stopped her tizanidine.  I have no new imaging to review today.  On examination, the patient remains alert, oriented, and compliant with the exam.  The patient demonstrates full strength in the upper and lower extremities bilaterally.  The patient's incisions are clean, dry, and intact.  Taken together, I am gratified to the patient doing well following her surgical intervention.  At this time, I explained to the patient that she no longer has any activity restrictions and may resume all of her preoperative activities.  The patient was advised to discuss her sleeping habits with her primary care physician to determine if there are other underlying causes for her difficulty sleeping other than pain.  I have advised the patient that she may continue using tizanidine as needed for muscle pain.  The patient will be following up on an as-needed basis at this time depending on her symptoms. [FreeTextEntry3] : Dean Cantu MD, PhD, FRCPSC  Attending Neurosurgeon  [address], [address] [place] [address]  Office: [phone]  Fax: [phone]

## 2023-09-12 ENCOUNTER — APPOINTMENT (OUTPATIENT)
Dept: OBGYN | Facility: CLINIC | Age: 53
End: 2023-09-12
Payer: MEDICARE

## 2023-09-12 DIAGNOSIS — Z13.71 ENCOUNTER FOR NONPROCREATIVE SCREENING FOR GENETIC DISEASE CARRIER STATUS: ICD-10-CM

## 2023-09-12 PROCEDURE — 99212 OFFICE O/P EST SF 10 MIN: CPT

## 2023-09-12 PROCEDURE — 36415 COLL VENOUS BLD VENIPUNCTURE: CPT

## 2023-09-14 PROBLEM — Z13.71 SCREENING FOR GENETIC DISEASE CARRIER STATUS: Status: ACTIVE | Noted: 2023-09-14

## 2023-10-16 ENCOUNTER — APPOINTMENT (OUTPATIENT)
Dept: OBGYN | Facility: CLINIC | Age: 53
End: 2023-10-16
Payer: MEDICARE

## 2023-10-16 VITALS
BODY MASS INDEX: 27.01 KG/M2 | WEIGHT: 134 LBS | SYSTOLIC BLOOD PRESSURE: 116 MMHG | HEIGHT: 59 IN | DIASTOLIC BLOOD PRESSURE: 70 MMHG

## 2023-10-16 PROCEDURE — 99213 OFFICE O/P EST LOW 20 MIN: CPT

## 2023-10-19 ENCOUNTER — APPOINTMENT (OUTPATIENT)
Dept: COLORECTAL SURGERY | Facility: CLINIC | Age: 53
End: 2023-10-19
Payer: MEDICARE

## 2023-10-19 DIAGNOSIS — Z12.11 ENCOUNTER FOR SCREENING FOR MALIGNANT NEOPLASM OF COLON: ICD-10-CM

## 2023-10-19 PROCEDURE — 99443: CPT | Mod: 95

## 2023-10-23 ENCOUNTER — OUTPATIENT (OUTPATIENT)
Dept: OUTPATIENT SERVICES | Facility: HOSPITAL | Age: 53
LOS: 1 days | End: 2023-10-23
Payer: MEDICARE

## 2023-10-23 ENCOUNTER — RESULT REVIEW (OUTPATIENT)
Age: 53
End: 2023-10-23

## 2023-10-23 ENCOUNTER — APPOINTMENT (OUTPATIENT)
Dept: ULTRASOUND IMAGING | Facility: CLINIC | Age: 53
End: 2023-10-23
Payer: MEDICARE

## 2023-10-23 DIAGNOSIS — Z98.890 OTHER SPECIFIED POSTPROCEDURAL STATES: Chronic | ICD-10-CM

## 2023-10-23 DIAGNOSIS — Z15.09 GENETIC SUSCEPTIBILITY TO OTHER MALIGNANT NEOPLASM: ICD-10-CM

## 2023-10-23 DIAGNOSIS — Z98.891 HISTORY OF UTERINE SCAR FROM PREVIOUS SURGERY: Chronic | ICD-10-CM

## 2023-10-23 DIAGNOSIS — Z98.51 TUBAL LIGATION STATUS: Chronic | ICD-10-CM

## 2023-10-23 PROCEDURE — 76856 US EXAM PELVIC COMPLETE: CPT

## 2023-10-23 PROCEDURE — 76830 TRANSVAGINAL US NON-OB: CPT

## 2023-10-23 PROCEDURE — 76830 TRANSVAGINAL US NON-OB: CPT | Mod: 26

## 2023-10-23 PROCEDURE — 76856 US EXAM PELVIC COMPLETE: CPT | Mod: 26

## 2023-10-26 ENCOUNTER — NON-APPOINTMENT (OUTPATIENT)
Age: 53
End: 2023-10-26

## 2023-11-07 ENCOUNTER — NON-APPOINTMENT (OUTPATIENT)
Age: 53
End: 2023-11-07

## 2023-12-12 ENCOUNTER — NON-APPOINTMENT (OUTPATIENT)
Age: 53
End: 2023-12-12

## 2024-01-16 ENCOUNTER — APPOINTMENT (OUTPATIENT)
Dept: ENDOCRINOLOGY | Facility: CLINIC | Age: 54
End: 2024-01-16

## 2024-02-14 ENCOUNTER — OUTPATIENT (OUTPATIENT)
Dept: OUTPATIENT SERVICES | Facility: HOSPITAL | Age: 54
LOS: 1 days | End: 2024-02-14
Payer: MEDICARE

## 2024-02-14 ENCOUNTER — RESULT REVIEW (OUTPATIENT)
Age: 54
End: 2024-02-14

## 2024-02-14 ENCOUNTER — APPOINTMENT (OUTPATIENT)
Dept: MAMMOGRAPHY | Facility: CLINIC | Age: 54
End: 2024-02-14
Payer: MEDICARE

## 2024-02-14 ENCOUNTER — APPOINTMENT (OUTPATIENT)
Dept: OBGYN | Facility: CLINIC | Age: 54
End: 2024-02-14
Payer: MEDICARE

## 2024-02-14 VITALS — BODY MASS INDEX: 26.26 KG/M2 | DIASTOLIC BLOOD PRESSURE: 68 MMHG | SYSTOLIC BLOOD PRESSURE: 112 MMHG | WEIGHT: 130 LBS

## 2024-02-14 DIAGNOSIS — Z98.890 OTHER SPECIFIED POSTPROCEDURAL STATES: Chronic | ICD-10-CM

## 2024-02-14 DIAGNOSIS — Z12.39 ENCOUNTER FOR OTHER SCREENING FOR MALIGNANT NEOPLASM OF BREAST: ICD-10-CM

## 2024-02-14 DIAGNOSIS — N92.6 IRREGULAR MENSTRUATION, UNSPECIFIED: ICD-10-CM

## 2024-02-14 DIAGNOSIS — Z12.4 ENCOUNTER FOR SCREENING FOR MALIGNANT NEOPLASM OF CERVIX: ICD-10-CM

## 2024-02-14 DIAGNOSIS — Z98.51 TUBAL LIGATION STATUS: Chronic | ICD-10-CM

## 2024-02-14 DIAGNOSIS — Z01.419 ENCOUNTER FOR GYNECOLOGICAL EXAMINATION (GENERAL) (ROUTINE) W/OUT ABNORMAL FINDINGS: ICD-10-CM

## 2024-02-14 DIAGNOSIS — Z98.891 HISTORY OF UTERINE SCAR FROM PREVIOUS SURGERY: Chronic | ICD-10-CM

## 2024-02-14 DIAGNOSIS — R92.30 DENSE BREASTS, UNSPECIFIED: ICD-10-CM

## 2024-02-14 LAB
CARD LOT #: NORMAL
CARD LOT EXP DATE: NORMAL
DATE COLLECTED: NORMAL
DATE COLLECTED: NORMAL
DEVELOPER LOT #: NORMAL
DEVELOPER LOT EXP DATE: NORMAL
HEMOCCULT 2: NEGATIVE
HEMOCCULT SP1 STL QL: NEGATIVE
QUALITY CONTROL: YES
QUALITY CONTROL: YES

## 2024-02-14 PROCEDURE — 77067 SCR MAMMO BI INCL CAD: CPT

## 2024-02-14 PROCEDURE — 82270 OCCULT BLOOD FECES: CPT

## 2024-02-14 PROCEDURE — G0101: CPT

## 2024-02-14 PROCEDURE — 77063 BREAST TOMOSYNTHESIS BI: CPT

## 2024-02-14 PROCEDURE — 77067 SCR MAMMO BI INCL CAD: CPT | Mod: 26

## 2024-02-14 PROCEDURE — 77063 BREAST TOMOSYNTHESIS BI: CPT | Mod: 26

## 2024-02-16 ENCOUNTER — APPOINTMENT (OUTPATIENT)
Dept: ULTRASOUND IMAGING | Facility: CLINIC | Age: 54
End: 2024-02-16
Payer: MEDICARE

## 2024-02-16 ENCOUNTER — RESULT REVIEW (OUTPATIENT)
Age: 54
End: 2024-02-16

## 2024-02-16 ENCOUNTER — OUTPATIENT (OUTPATIENT)
Dept: OUTPATIENT SERVICES | Facility: HOSPITAL | Age: 54
LOS: 1 days | End: 2024-02-16
Payer: MEDICARE

## 2024-02-16 DIAGNOSIS — Z15.09 GENETIC SUSCEPTIBILITY TO OTHER MALIGNANT NEOPLASM: ICD-10-CM

## 2024-02-16 DIAGNOSIS — Z98.890 OTHER SPECIFIED POSTPROCEDURAL STATES: Chronic | ICD-10-CM

## 2024-02-16 DIAGNOSIS — Z98.51 TUBAL LIGATION STATUS: Chronic | ICD-10-CM

## 2024-02-16 DIAGNOSIS — Z98.891 HISTORY OF UTERINE SCAR FROM PREVIOUS SURGERY: Chronic | ICD-10-CM

## 2024-02-16 DIAGNOSIS — N92.6 IRREGULAR MENSTRUATION, UNSPECIFIED: ICD-10-CM

## 2024-02-16 PROCEDURE — 76856 US EXAM PELVIC COMPLETE: CPT | Mod: 26

## 2024-02-16 PROCEDURE — 76856 US EXAM PELVIC COMPLETE: CPT

## 2024-02-16 PROCEDURE — 76830 TRANSVAGINAL US NON-OB: CPT

## 2024-02-16 PROCEDURE — 76830 TRANSVAGINAL US NON-OB: CPT | Mod: 26

## 2024-02-21 LAB
CYTOLOGY CVX/VAG DOC THIN PREP: NORMAL
HPV HIGH+LOW RISK DNA PNL CVX: NOT DETECTED

## 2024-03-03 NOTE — DISCUSSION/SUMMARY
[FreeTextEntry1] : Health Maintenance: -Pap with HPV -Mammo Rx -TBSE -colonoscopy guidelines reviewed with patient -Achieve Vit D levels of 30-40, intake of 1100 mg daily calcium mostly thru dark green leafy greens and milk products, exercise 30 minutes TIW  US pelvis ordered for irregular menses will follow cycles perimenopause suspected If eCHO > 6 mm, then endotrial bx

## 2024-03-03 NOTE — HISTORY OF PRESENT ILLNESS
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 Gyn cancer  Menstrual triad: [id number]  OB:  C/S x 2 sons-  x 1 year   and  with BTL  GYN: All paps normal 2021 MRI pelvis adenomyosis.  PMHx: Spondolythesis- with scheduled lumbar fusion scheduled 23  Sx: C/S BTL Lumbar fusion ()  FHx: Mother:  from [facility] (from drunk ) when pt was 6yo Sister: (1/2 sister) same mother ( + BARD-1 PMS-2) Sister's son ( nephew)- (+ BARD 1 PMS 2)- dx at 27yo with stage 4 colon cancer, after bleeding with BMs Sister's daughter (niece) (+ BARD 1 PMS 2) Mat GM- Stomach cancer Mat aunt: ? Gyn cancer  SH: , sexually active  [Mammogramdate] : 2/9/2023 [BreastSonogramDate] : 2/9/2023 [TextBox_19] : BIRADS 1 [TextBox_25] : BIRADS 1 [PapSmeardate] : 2022 [TextBox_31] : NL/HPV neg  [TextBox_43] : not yet

## 2024-03-19 ENCOUNTER — APPOINTMENT (OUTPATIENT)
Dept: ENDOCRINOLOGY | Facility: CLINIC | Age: 54
End: 2024-03-19

## 2024-03-22 LAB
T4 FREE SERPL-MCNC: 1 NG/DL
TSH SERPL-ACNC: 5.13 UIU/ML

## 2024-03-25 ENCOUNTER — APPOINTMENT (OUTPATIENT)
Dept: ENDOCRINOLOGY | Facility: CLINIC | Age: 54
End: 2024-03-25
Payer: MEDICARE

## 2024-03-25 VITALS
DIASTOLIC BLOOD PRESSURE: 70 MMHG | WEIGHT: 130 LBS | BODY MASS INDEX: 26.21 KG/M2 | OXYGEN SATURATION: 99 % | HEIGHT: 59 IN | HEART RATE: 84 BPM | SYSTOLIC BLOOD PRESSURE: 124 MMHG

## 2024-03-25 PROCEDURE — 99213 OFFICE O/P EST LOW 20 MIN: CPT

## 2024-03-25 RX ORDER — METHYLPREDNISOLONE 4 MG/1
4 TABLET ORAL
Qty: 1 | Refills: 0 | Status: DISCONTINUED | COMMUNITY
Start: 2023-04-17 | End: 2024-03-25

## 2024-03-25 RX ORDER — GABAPENTIN 300 MG/1
300 CAPSULE ORAL 3 TIMES DAILY
Qty: 15 | Refills: 0 | Status: DISCONTINUED | COMMUNITY
Start: 2023-04-17 | End: 2024-03-25

## 2024-03-25 RX ORDER — GABAPENTIN 100 MG/1
100 CAPSULE ORAL 3 TIMES DAILY
Qty: 30 | Refills: 2 | Status: DISCONTINUED | COMMUNITY
Start: 2023-04-17 | End: 2024-03-25

## 2024-03-25 RX ORDER — TIZANIDINE 4 MG/1
4 TABLET ORAL
Qty: 30 | Refills: 0 | Status: DISCONTINUED | COMMUNITY
Start: 2021-12-23 | End: 2024-03-25

## 2024-03-25 RX ORDER — CYCLOBENZAPRINE HYDROCHLORIDE 10 MG/1
10 TABLET, FILM COATED ORAL
Refills: 0 | Status: DISCONTINUED | COMMUNITY
End: 2024-03-25

## 2024-03-25 RX ORDER — GABAPENTIN 100 MG/1
100 CAPSULE ORAL DAILY
Qty: 8 | Refills: 0 | Status: DISCONTINUED | COMMUNITY
Start: 2023-04-17 | End: 2024-03-25

## 2024-03-25 RX ORDER — GABAPENTIN 100 MG/1
100 CAPSULE ORAL 3 TIMES DAILY
Qty: 30 | Refills: 0 | Status: DISCONTINUED | COMMUNITY
Start: 2023-04-17 | End: 2024-03-25

## 2024-03-25 NOTE — PHYSICAL EXAM
[Alert] : alert [Normal Hearing] : hearing was normal [Normal Sclera/Conjunctiva] : normal sclera/conjunctiva [No Neck Mass] : no neck mass was observed [Thyroid Not Enlarged] : the thyroid was not enlarged [No Accessory Muscle Use] : no accessory muscle use [No Respiratory Distress] : no respiratory distress [Clear to Auscultation] : lungs were clear to auscultation bilaterally [No Stigmata of Cushings Syndrome] : no stigmata of Cushings Syndrome [Normal S1, S2] : normal S1 and S2 [Normal Gait] : normal gait [Oriented x3] : oriented to person, place, and time

## 2024-03-25 NOTE — HISTORY OF PRESENT ILLNESS
[FreeTextEntry1] : Pre-Covid she had slightly abnormal thyroid levels on routine labs and was without symptoms and was prescribed Synthroid or LT4. WIthin 1 week developed blurry vision, eye throbbing, pimples on face, scab in scalp and hair loss. When she stopped the thyroid med - symptoms resolved.  - Had Covid 12/25/2021 and labs 2/8/22 TSH 4.31, FT4 1.0, (+) TPO ab 2191  - not on thyroid meds  - also with h/o hyperprolactinemia and brain scans normal, prolactin level 6/2022 was normal  Interval hx -2/24/2023 back surgery had fusion. feeling well   -TSH elevated in blood work, pt really does not want to go on medication

## 2024-03-25 NOTE — REVIEW OF SYSTEMS
[Fatigue] : fatigue [Visual Field Defect] : no visual field defect [Dysphagia] : no dysphagia [Chest Pain] : no chest pain [Palpitations] : no palpitations [Shortness Of Breath] : no shortness of breath [Nausea] : no nausea [Constipation] : no constipation [Vomiting] : no vomiting [Diarrhea] : no diarrhea [Polyuria] : no polyuria [Polydipsia] : no polydipsia

## 2024-03-25 NOTE — ASSESSMENT
[FreeTextEntry1] : 54 year old female with  1. hashimoto's thyroid disease - she is euthyroid on exam but prior labs show trace TSH elevation with normal Free T4 levels. Repeat TSH 6/2022 was normal   - we discussed hashimoto's thyroid disease - thyroid ab vs thyroid levels, discussed mechanism by which ab cause low thyroid levels, discussed that medication aim to treat thyroid hormone levels, not the ab levels  - repeat TSH was elevated, declines use of medication She wants to change her diet, as she said it worked in the past removing dairy and gluten from diet -Discussed that diet may not help with thyroid function and she still may need to start w trial of Synthroid (prior allergy reported consisted of worsening skin lesion/scalp issues not typically associated w an allergic reaction) prior to [place] thyroid.  -Will repeat levels in 4 weeks  RTO in 3 months NP and 6 months MD

## 2024-04-24 LAB — FSH SERPL-MCNC: 7.2 IU/L

## 2024-04-28 RX ORDER — TIZANIDINE 4 MG/1
4 TABLET ORAL
Qty: 30 | Refills: 0 | Status: ACTIVE | COMMUNITY
Start: 2024-04-28 | End: 1900-01-01

## 2024-04-28 RX ORDER — METHYLPREDNISOLONE 4 MG/1
4 TABLET ORAL
Qty: 1 | Refills: 0 | Status: ACTIVE | COMMUNITY
Start: 2024-04-28 | End: 1900-01-01

## 2024-05-22 LAB — ESTRADIOL SERPL-MCNC: 198 PG/ML

## 2024-06-12 ENCOUNTER — APPOINTMENT (OUTPATIENT)
Dept: COLORECTAL SURGERY | Facility: CLINIC | Age: 54
End: 2024-06-12
Payer: MEDICARE

## 2024-06-12 DIAGNOSIS — Z15.09 GENETIC SUSCEPTIBILITY TO OTHER MALIGNANT NEOPLASM: ICD-10-CM

## 2024-06-12 PROCEDURE — 99443: CPT | Mod: 93

## 2024-06-12 NOTE — REASON FOR VISIT
[Home] : at home, [unfilled] , at the time of the visit. [Medical Office: ([facility])___] : at the medical office located in  [Verbal consent obtained from patient] : the patient, [unfilled] [Follow-Up: _____] : a [unfilled] follow-up visit

## 2024-06-12 NOTE — HISTORY OF PRESENT ILLNESS
[FreeTextEntry1] : 54 year old female who presents for a telehealth visit for screening colonoscopy.  She was recently scheduled but did not make it due to an infection.   She denies any gastrointestinal symptoms.  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

## 2024-06-12 NOTE — PLAN
[TextEntry] : 54-year-old female in need of a screening colonoscopy. Ha PMS2 gene mutation. I reviewed the risks, benefits and alternative of pursuing a colonoscopy. Risks of colonoscopy was reviewed including but not limited to cardiopulmonary complications, risk of bleeding, infection, missed lesions, perforation which could result in emergent surgery. Will proceed with scheduling colonoscopy

## 2024-06-17 ENCOUNTER — NON-APPOINTMENT (OUTPATIENT)
Age: 54
End: 2024-06-17

## 2024-06-19 LAB
T3 SERPL-MCNC: 104 NG/DL
T3FREE SERPL-MCNC: 2.43 PG/ML
T4 FREE SERPL-MCNC: 1 NG/DL
T4 SERPL-MCNC: 6.3 UG/DL
TSH SERPL-ACNC: 3.51 UIU/ML

## 2024-06-20 ENCOUNTER — NON-APPOINTMENT (OUTPATIENT)
Age: 54
End: 2024-06-20

## 2024-06-20 LAB
THYROGLOB AB SERPL-ACNC: 141 IU/ML
THYROPEROXIDASE AB SERPL IA-ACNC: 2084 IU/ML

## 2024-06-24 ENCOUNTER — APPOINTMENT (OUTPATIENT)
Dept: ENDOCRINOLOGY | Facility: CLINIC | Age: 54
End: 2024-06-24
Payer: MEDICARE

## 2024-06-24 VITALS
DIASTOLIC BLOOD PRESSURE: 72 MMHG | WEIGHT: 135 LBS | OXYGEN SATURATION: 97 % | SYSTOLIC BLOOD PRESSURE: 118 MMHG | HEART RATE: 80 BPM | BODY MASS INDEX: 27.21 KG/M2 | HEIGHT: 59 IN

## 2024-06-24 DIAGNOSIS — E06.3 AUTOIMMUNE THYROIDITIS: ICD-10-CM

## 2024-06-24 PROCEDURE — 99213 OFFICE O/P EST LOW 20 MIN: CPT

## 2024-07-19 ENCOUNTER — APPOINTMENT (OUTPATIENT)
Dept: COLORECTAL SURGERY | Facility: AMBULATORY MEDICAL SERVICES | Age: 54
End: 2024-07-19

## 2024-07-19 DIAGNOSIS — Z15.09 GENETIC SUSCEPTIBILITY TO OTHER MALIGNANT NEOPLASM: ICD-10-CM

## 2024-07-19 DIAGNOSIS — Z12.11 ENCOUNTER FOR SCREENING FOR MALIGNANT NEOPLASM OF COLON: ICD-10-CM

## 2024-07-19 PROCEDURE — 45378 DIAGNOSTIC COLONOSCOPY: CPT

## 2024-09-15 NOTE — H&P PST ADULT - PT PULSE
Refill Decision Note   Ramos Miranda  is requesting a refill authorization.  Brief Assessment and Rationale for Refill:  Approve     Medication Therapy Plan:        Comments:     Note composed:2:25 PM 09/15/2024            right normal/left normal

## 2024-10-18 ENCOUNTER — APPOINTMENT (OUTPATIENT)
Dept: ENDOCRINOLOGY | Facility: CLINIC | Age: 54
End: 2024-10-18
Payer: MEDICARE

## 2024-10-18 VITALS
HEIGHT: 59 IN | WEIGHT: 134 LBS | DIASTOLIC BLOOD PRESSURE: 70 MMHG | OXYGEN SATURATION: 98 % | HEART RATE: 84 BPM | BODY MASS INDEX: 27.01 KG/M2 | SYSTOLIC BLOOD PRESSURE: 110 MMHG

## 2024-10-18 DIAGNOSIS — R73.01 IMPAIRED FASTING GLUCOSE: ICD-10-CM

## 2024-10-18 DIAGNOSIS — E06.3 AUTOIMMUNE THYROIDITIS: ICD-10-CM

## 2024-10-18 DIAGNOSIS — E03.8 OTHER SPECIFIED HYPOTHYROIDISM: ICD-10-CM

## 2024-10-18 PROCEDURE — 99215 OFFICE O/P EST HI 40 MIN: CPT

## 2024-10-25 ENCOUNTER — OUTPATIENT (OUTPATIENT)
Dept: OUTPATIENT SERVICES | Facility: HOSPITAL | Age: 54
LOS: 1 days | End: 2024-10-25
Payer: MEDICARE

## 2024-10-25 ENCOUNTER — APPOINTMENT (OUTPATIENT)
Dept: NEUROSURGERY | Facility: CLINIC | Age: 54
End: 2024-10-25
Payer: MEDICARE

## 2024-10-25 ENCOUNTER — APPOINTMENT (OUTPATIENT)
Dept: RADIOLOGY | Facility: CLINIC | Age: 54
End: 2024-10-25

## 2024-10-25 VITALS
WEIGHT: 130 LBS | HEIGHT: 59 IN | DIASTOLIC BLOOD PRESSURE: 78 MMHG | OXYGEN SATURATION: 100 % | SYSTOLIC BLOOD PRESSURE: 123 MMHG | HEART RATE: 65 BPM | BODY MASS INDEX: 26.21 KG/M2

## 2024-10-25 DIAGNOSIS — Z98.890 OTHER SPECIFIED POSTPROCEDURAL STATES: Chronic | ICD-10-CM

## 2024-10-25 DIAGNOSIS — Z98.51 TUBAL LIGATION STATUS: Chronic | ICD-10-CM

## 2024-10-25 DIAGNOSIS — Z98.1 ARTHRODESIS STATUS: ICD-10-CM

## 2024-10-25 DIAGNOSIS — M54.10 RADICULOPATHY, SITE UNSPECIFIED: ICD-10-CM

## 2024-10-25 DIAGNOSIS — M54.50 LOW BACK PAIN, UNSPECIFIED: ICD-10-CM

## 2024-10-25 PROCEDURE — 72110 X-RAY EXAM L-2 SPINE 4/>VWS: CPT | Mod: 26

## 2024-10-25 PROCEDURE — 99214 OFFICE O/P EST MOD 30 MIN: CPT

## 2024-10-25 RX ORDER — TIZANIDINE 2 MG/1
2 TABLET ORAL EVERY 8 HOURS
Qty: 30 | Refills: 2 | Status: ACTIVE | COMMUNITY
Start: 2024-10-25 | End: 1900-01-01

## 2024-10-25 RX ORDER — MELOXICAM 7.5 MG/1
7.5 TABLET ORAL TWICE DAILY
Qty: 30 | Refills: 1 | Status: ACTIVE | COMMUNITY
Start: 2024-10-25 | End: 1900-01-01

## 2024-10-30 NOTE — H&P PST ADULT - FUNCTIONAL STATUS
Prep Survey      Flowsheet Row Responses   [facility] facility patient discharged from? Non-[facility]   Is LACE score < 7 ? Non-[facility] Discharge   Eligibility Not Eligible   [facility]   Date of Admission 24   Date of Discharge 10/04/24   Discharge Disposition Home or Self Care   What are the reasons patient is not eligible? Hospice/Pallative Care  [Pt ]   Discharge diagnosis Symptomatic anemia/ESRD  LLE angiogram with balloon angioplasty   Does the patient have one of the following disease processes/diagnoses(primary or secondary)? General Surgery   Does the patient have Home health ordered? Yes   Prep survey completed? Yes            DOUG JUNIOR - Registered Nurse          
4-10 METS

## 2024-11-20 PROCEDURE — 72110 X-RAY EXAM L-2 SPINE 4/>VWS: CPT

## 2024-12-04 ENCOUNTER — NON-APPOINTMENT (OUTPATIENT)
Age: 54
End: 2024-12-04

## 2024-12-04 DIAGNOSIS — R10.30 LOWER ABDOMINAL PAIN, UNSPECIFIED: ICD-10-CM
